# Patient Record
Sex: MALE | Race: OTHER
[De-identification: names, ages, dates, MRNs, and addresses within clinical notes are randomized per-mention and may not be internally consistent; named-entity substitution may affect disease eponyms.]

---

## 2018-09-08 ENCOUNTER — HOSPITAL ENCOUNTER (EMERGENCY)
Age: 58
Discharge: HOME | End: 2018-09-08

## 2018-09-08 ENCOUNTER — HOSPITAL ENCOUNTER (EMERGENCY)
Dept: HOSPITAL 91 - E/R | Age: 58
Discharge: HOME | End: 2018-09-08
Payer: COMMERCIAL

## 2018-09-08 DIAGNOSIS — I10: ICD-10-CM

## 2018-09-08 DIAGNOSIS — G51.0: Primary | ICD-10-CM

## 2018-09-08 PROCEDURE — 99283 EMERGENCY DEPT VISIT LOW MDM: CPT

## 2021-12-07 ENCOUNTER — HOSPITAL ENCOUNTER (EMERGENCY)
Dept: HOSPITAL 54 - ER | Age: 61
Discharge: HOME | End: 2021-12-07
Payer: MEDICAID

## 2021-12-07 VITALS — BODY MASS INDEX: 31.83 KG/M2 | HEIGHT: 72 IN | WEIGHT: 235 LBS

## 2021-12-07 VITALS — DIASTOLIC BLOOD PRESSURE: 87 MMHG | SYSTOLIC BLOOD PRESSURE: 141 MMHG

## 2021-12-07 DIAGNOSIS — I10: ICD-10-CM

## 2021-12-07 DIAGNOSIS — E78.00: ICD-10-CM

## 2021-12-07 DIAGNOSIS — R91.8: ICD-10-CM

## 2021-12-07 DIAGNOSIS — U07.1: Primary | ICD-10-CM

## 2021-12-07 PROCEDURE — 87426 SARSCOV CORONAVIRUS AG IA: CPT

## 2021-12-07 PROCEDURE — 96372 THER/PROPH/DIAG INJ SC/IM: CPT

## 2021-12-07 PROCEDURE — C9803 HOPD COVID-19 SPEC COLLECT: HCPCS

## 2021-12-07 PROCEDURE — 99284 EMERGENCY DEPT VISIT MOD MDM: CPT

## 2021-12-07 PROCEDURE — 71045 X-RAY EXAM CHEST 1 VIEW: CPT

## 2021-12-07 NOTE — NUR
RECEIVED CALL FROM LAB THAT PATIENT TESTED POSITIVE FOR COVID, CALLED AND LEFT 
MESSAGE TO INFORM PATIENT

## 2021-12-08 ENCOUNTER — HOSPITAL ENCOUNTER (INPATIENT)
Dept: HOSPITAL 54 - ER | Age: 61
LOS: 15 days | Discharge: HOME | DRG: 137 | End: 2021-12-23
Attending: INTERNAL MEDICINE | Admitting: INTERNAL MEDICINE
Payer: MEDICAID

## 2021-12-08 VITALS — HEIGHT: 72 IN | WEIGHT: 210 LBS | BODY MASS INDEX: 28.44 KG/M2

## 2021-12-08 DIAGNOSIS — K59.00: ICD-10-CM

## 2021-12-08 DIAGNOSIS — N17.9: ICD-10-CM

## 2021-12-08 DIAGNOSIS — Z79.899: ICD-10-CM

## 2021-12-08 DIAGNOSIS — J96.01: ICD-10-CM

## 2021-12-08 DIAGNOSIS — E78.00: ICD-10-CM

## 2021-12-08 DIAGNOSIS — E66.9: ICD-10-CM

## 2021-12-08 DIAGNOSIS — Z87.891: ICD-10-CM

## 2021-12-08 DIAGNOSIS — E78.5: ICD-10-CM

## 2021-12-08 DIAGNOSIS — J12.82: ICD-10-CM

## 2021-12-08 DIAGNOSIS — J15.9: ICD-10-CM

## 2021-12-08 DIAGNOSIS — U07.1: Primary | ICD-10-CM

## 2021-12-08 DIAGNOSIS — R74.01: ICD-10-CM

## 2021-12-08 DIAGNOSIS — I10: ICD-10-CM

## 2021-12-08 DIAGNOSIS — R73.03: ICD-10-CM

## 2021-12-08 DIAGNOSIS — E66.01: ICD-10-CM

## 2021-12-08 DIAGNOSIS — D69.6: ICD-10-CM

## 2021-12-08 PROCEDURE — G0378 HOSPITAL OBSERVATION PER HR: HCPCS

## 2021-12-08 PROCEDURE — A4216 STERILE WATER/SALINE, 10 ML: HCPCS

## 2021-12-08 PROCEDURE — U0003 INFECTIOUS AGENT DETECTION BY NUCLEIC ACID (DNA OR RNA); SEVERE ACUTE RESPIRATORY SYNDROME CORONAVIRUS 2 (SARS-COV-2) (CORONAVIRUS DISEASE [COVID-19]), AMPLIFIED PROBE TECHNIQUE, MAKING USE OF HIGH THROUGHPUT TECHNOLOGIES AS DESCRIBED BY CMS-2020-01-R: HCPCS

## 2021-12-09 VITALS — DIASTOLIC BLOOD PRESSURE: 73 MMHG | SYSTOLIC BLOOD PRESSURE: 123 MMHG

## 2021-12-09 VITALS — SYSTOLIC BLOOD PRESSURE: 150 MMHG | DIASTOLIC BLOOD PRESSURE: 95 MMHG

## 2021-12-09 LAB
ALBUMIN SERPL BCP-MCNC: 3.3 G/DL (ref 3.4–5)
ALP SERPL-CCNC: 47 U/L (ref 46–116)
ALT SERPL W P-5'-P-CCNC: 106 U/L (ref 12–78)
AST SERPL W P-5'-P-CCNC: 86 U/L (ref 15–37)
BASOPHILS # BLD AUTO: 0 K/UL (ref 0–0.2)
BASOPHILS NFR BLD AUTO: 0.3 % (ref 0–2)
BILIRUB DIRECT SERPL-MCNC: 0.2 MG/DL (ref 0–0.2)
BILIRUB SERPL-MCNC: 0.4 MG/DL (ref 0.2–1)
BUN SERPL-MCNC: 17 MG/DL (ref 7–18)
CALCIUM SERPL-MCNC: 7.7 MG/DL (ref 8.5–10.1)
CHLORIDE SERPL-SCNC: 95 MMOL/L (ref 98–107)
CO2 SERPL-SCNC: 31 MMOL/L (ref 21–32)
CREAT SERPL-MCNC: 1.2 MG/DL (ref 0.6–1.3)
CRP SERPL-MCNC: 14.8 MG/DL (ref 0–0.9)
EOSINOPHIL NFR BLD AUTO: 0.1 % (ref 0–6)
FERRITIN SERPL-MCNC: 1880 NG/ML (ref 8–388)
GLUCOSE SERPL-MCNC: 106 MG/DL (ref 74–106)
HCT VFR BLD AUTO: 38 % (ref 39–51)
HGB BLD-MCNC: 12.8 G/DL (ref 13.5–17.5)
LYMPHOCYTES NFR BLD AUTO: 0.6 K/UL (ref 0.8–4.8)
LYMPHOCYTES NFR BLD AUTO: 15.3 % (ref 20–44)
MCHC RBC AUTO-ENTMCNC: 34 G/DL (ref 31–36)
MCV RBC AUTO: 86 FL (ref 80–96)
MONOCYTES NFR BLD AUTO: 0.2 K/UL (ref 0.1–1.3)
MONOCYTES NFR BLD AUTO: 5.6 % (ref 2–12)
NEUTROPHILS # BLD AUTO: 3 K/UL (ref 1.8–8.9)
NEUTROPHILS NFR BLD AUTO: 78.7 % (ref 43–81)
PLATELET # BLD AUTO: 121 K/UL (ref 150–450)
POTASSIUM SERPL-SCNC: 4.4 MMOL/L (ref 3.5–5.1)
PROT SERPL-MCNC: 6.9 G/DL (ref 6.4–8.2)
RBC # BLD AUTO: 4.44 MIL/UL (ref 4.5–6)
SODIUM SERPL-SCNC: 130 MMOL/L (ref 136–145)
WBC NRBC COR # BLD AUTO: 3.8 K/UL (ref 4.3–11)

## 2021-12-09 PROCEDURE — XW033E5 INTRODUCTION OF REMDESIVIR ANTI-INFECTIVE INTO PERIPHERAL VEIN, PERCUTANEOUS APPROACH, NEW TECHNOLOGY GROUP 5: ICD-10-PCS | Performed by: INTERNAL MEDICINE

## 2021-12-09 RX ADMIN — DEXAMETHASONE SODIUM PHOSPHATE SCH MG: 10 INJECTION INTRAMUSCULAR; INTRAVENOUS at 13:50

## 2021-12-09 RX ADMIN — DEXTROSE MONOHYDRATE SCH MLS/HR: 50 INJECTION, SOLUTION INTRAVENOUS at 21:50

## 2021-12-09 RX ADMIN — ENOXAPARIN SODIUM SCH MG: 40 INJECTION SUBCUTANEOUS at 15:20

## 2021-12-09 RX ADMIN — AMLODIPINE BESYLATE SCH MG: 10 TABLET ORAL at 14:06

## 2021-12-09 RX ADMIN — GUAIFENESIN AND DEXTROMETHORPHAN PRN ML: 100; 10 SYRUP ORAL at 22:57

## 2021-12-09 RX ADMIN — DEXAMETHASONE SODIUM PHOSPHATE SCH MG: 10 INJECTION INTRAMUSCULAR; INTRAVENOUS at 21:14

## 2021-12-09 RX ADMIN — ATORVASTATIN CALCIUM SCH MG: 10 TABLET, FILM COATED ORAL at 21:13

## 2021-12-09 RX ADMIN — GUAIFENESIN AND DEXTROMETHORPHAN PRN ML: 100; 10 SYRUP ORAL at 15:20

## 2021-12-10 VITALS — SYSTOLIC BLOOD PRESSURE: 129 MMHG | DIASTOLIC BLOOD PRESSURE: 65 MMHG

## 2021-12-10 VITALS — SYSTOLIC BLOOD PRESSURE: 114 MMHG | DIASTOLIC BLOOD PRESSURE: 81 MMHG

## 2021-12-10 VITALS — SYSTOLIC BLOOD PRESSURE: 140 MMHG | DIASTOLIC BLOOD PRESSURE: 60 MMHG

## 2021-12-10 VITALS — DIASTOLIC BLOOD PRESSURE: 70 MMHG | SYSTOLIC BLOOD PRESSURE: 115 MMHG

## 2021-12-10 LAB
ALBUMIN SERPL BCP-MCNC: 2.8 G/DL (ref 3.4–5)
ALP SERPL-CCNC: 46 U/L (ref 46–116)
ALT SERPL W P-5'-P-CCNC: 254 U/L (ref 12–78)
AST SERPL W P-5'-P-CCNC: 188 U/L (ref 15–37)
BASOPHILS # BLD AUTO: 0 K/UL (ref 0–0.2)
BASOPHILS NFR BLD AUTO: 0 % (ref 0–2)
BILIRUB SERPL-MCNC: 0.3 MG/DL (ref 0.2–1)
BUN SERPL-MCNC: 25 MG/DL (ref 7–18)
CALCIUM SERPL-MCNC: 8 MG/DL (ref 8.5–10.1)
CHLORIDE SERPL-SCNC: 102 MMOL/L (ref 98–107)
CO2 SERPL-SCNC: 25 MMOL/L (ref 21–32)
CREAT SERPL-MCNC: 1.2 MG/DL (ref 0.6–1.3)
CRP SERPL-MCNC: 15.6 MG/DL (ref 0–0.9)
EOSINOPHIL NFR BLD AUTO: 0 % (ref 0–6)
FERRITIN SERPL-MCNC: 2278 NG/ML (ref 8–388)
GLUCOSE SERPL-MCNC: 140 MG/DL (ref 74–106)
HCT VFR BLD AUTO: 38 % (ref 39–51)
HGB BLD-MCNC: 12.9 G/DL (ref 13.5–17.5)
LYMPHOCYTES NFR BLD AUTO: 0.3 K/UL (ref 0.8–4.8)
LYMPHOCYTES NFR BLD AUTO: 8.5 % (ref 20–44)
MCHC RBC AUTO-ENTMCNC: 34 G/DL (ref 31–36)
MCV RBC AUTO: 87 FL (ref 80–96)
MONOCYTES NFR BLD AUTO: 0.3 K/UL (ref 0.1–1.3)
MONOCYTES NFR BLD AUTO: 9 % (ref 2–12)
NEUTROPHILS # BLD AUTO: 2.7 K/UL (ref 1.8–8.9)
NEUTROPHILS NFR BLD AUTO: 82.5 % (ref 43–81)
PLATELET # BLD AUTO: 155 K/UL (ref 150–450)
POTASSIUM SERPL-SCNC: 4.4 MMOL/L (ref 3.5–5.1)
PROT SERPL-MCNC: 6.6 G/DL (ref 6.4–8.2)
RBC # BLD AUTO: 4.41 MIL/UL (ref 4.5–6)
SODIUM SERPL-SCNC: 135 MMOL/L (ref 136–145)
WBC NRBC COR # BLD AUTO: 3.2 K/UL (ref 4.3–11)

## 2021-12-10 RX ADMIN — DEXAMETHASONE SODIUM PHOSPHATE SCH MG: 10 INJECTION INTRAMUSCULAR; INTRAVENOUS at 13:53

## 2021-12-10 RX ADMIN — ENOXAPARIN SODIUM SCH MG: 40 INJECTION SUBCUTANEOUS at 21:57

## 2021-12-10 RX ADMIN — ATORVASTATIN CALCIUM SCH MG: 10 TABLET, FILM COATED ORAL at 21:54

## 2021-12-10 RX ADMIN — Medication SCH EACH: at 12:35

## 2021-12-10 RX ADMIN — SODIUM CHLORIDE SCH MLS/HR: 9 INJECTION, SOLUTION INTRAVENOUS at 18:12

## 2021-12-10 RX ADMIN — HUMAN INSULIN PRN UNIT: 100 INJECTION, SOLUTION SUBCUTANEOUS at 22:14

## 2021-12-10 RX ADMIN — Medication SCH EACH: at 21:54

## 2021-12-10 RX ADMIN — Medication SCH EACH: at 18:05

## 2021-12-10 RX ADMIN — GUAIFENESIN AND DEXTROMETHORPHAN PRN ML: 100; 10 SYRUP ORAL at 20:06

## 2021-12-10 RX ADMIN — DEXTROSE MONOHYDRATE SCH MLS/HR: 50 INJECTION, SOLUTION INTRAVENOUS at 21:54

## 2021-12-10 RX ADMIN — DEXAMETHASONE SODIUM PHOSPHATE SCH MG: 10 INJECTION INTRAMUSCULAR; INTRAVENOUS at 21:54

## 2021-12-10 RX ADMIN — AMLODIPINE BESYLATE SCH MG: 10 TABLET ORAL at 09:19

## 2021-12-10 RX ADMIN — DEXAMETHASONE SODIUM PHOSPHATE SCH MG: 10 INJECTION INTRAMUSCULAR; INTRAVENOUS at 05:31

## 2021-12-10 RX ADMIN — GUAIFENESIN AND DEXTROMETHORPHAN PRN ML: 100; 10 SYRUP ORAL at 09:20

## 2021-12-11 VITALS — DIASTOLIC BLOOD PRESSURE: 62 MMHG | SYSTOLIC BLOOD PRESSURE: 113 MMHG

## 2021-12-11 VITALS — SYSTOLIC BLOOD PRESSURE: 114 MMHG | DIASTOLIC BLOOD PRESSURE: 67 MMHG

## 2021-12-11 VITALS — SYSTOLIC BLOOD PRESSURE: 110 MMHG | DIASTOLIC BLOOD PRESSURE: 64 MMHG

## 2021-12-11 VITALS — SYSTOLIC BLOOD PRESSURE: 104 MMHG | DIASTOLIC BLOOD PRESSURE: 59 MMHG

## 2021-12-11 LAB
ALBUMIN SERPL BCP-MCNC: 3.1 G/DL (ref 3.4–5)
ALBUMIN SERPL BCP-MCNC: 3.1 G/DL (ref 3.4–5)
ALP SERPL-CCNC: 52 U/L (ref 46–116)
ALP SERPL-CCNC: 55 U/L (ref 46–116)
ALT SERPL W P-5'-P-CCNC: 346 U/L (ref 12–78)
ALT SERPL W P-5'-P-CCNC: 351 U/L (ref 12–78)
AST SERPL W P-5'-P-CCNC: 209 U/L (ref 15–37)
AST SERPL W P-5'-P-CCNC: 214 U/L (ref 15–37)
BASOPHILS # BLD AUTO: 0 K/UL (ref 0–0.2)
BASOPHILS NFR BLD AUTO: 0.1 % (ref 0–2)
BILIRUB DIRECT SERPL-MCNC: 0.2 MG/DL (ref 0–0.2)
BILIRUB SERPL-MCNC: 0.3 MG/DL (ref 0.2–1)
BILIRUB SERPL-MCNC: 0.3 MG/DL (ref 0.2–1)
BUN SERPL-MCNC: 45 MG/DL (ref 7–18)
CALCIUM SERPL-MCNC: 8.4 MG/DL (ref 8.5–10.1)
CHLORIDE SERPL-SCNC: 102 MMOL/L (ref 98–107)
CO2 SERPL-SCNC: 27 MMOL/L (ref 21–32)
CREAT SERPL-MCNC: 1.5 MG/DL (ref 0.6–1.3)
CRP SERPL-MCNC: 10.3 MG/DL (ref 0–0.9)
EOSINOPHIL NFR BLD AUTO: 0 % (ref 0–6)
GLUCOSE SERPL-MCNC: 142 MG/DL (ref 74–106)
HCT VFR BLD AUTO: 42 % (ref 39–51)
HGB BLD-MCNC: 14 G/DL (ref 13.5–17.5)
LYMPHOCYTES NFR BLD AUTO: 0.9 K/UL (ref 0.8–4.8)
LYMPHOCYTES NFR BLD AUTO: 8.9 % (ref 20–44)
MCHC RBC AUTO-ENTMCNC: 34 G/DL (ref 31–36)
MCV RBC AUTO: 87 FL (ref 80–96)
MONOCYTES NFR BLD AUTO: 0.5 K/UL (ref 0.1–1.3)
MONOCYTES NFR BLD AUTO: 5.5 % (ref 2–12)
NEUTROPHILS # BLD AUTO: 8.3 K/UL (ref 1.8–8.9)
NEUTROPHILS NFR BLD AUTO: 85.5 % (ref 43–81)
PLATELET # BLD AUTO: 272 K/UL (ref 150–450)
POTASSIUM SERPL-SCNC: 4.3 MMOL/L (ref 3.5–5.1)
PROT SERPL-MCNC: 7.5 G/DL (ref 6.4–8.2)
PROT SERPL-MCNC: 7.5 G/DL (ref 6.4–8.2)
RBC # BLD AUTO: 4.8 MIL/UL (ref 4.5–6)
SODIUM SERPL-SCNC: 138 MMOL/L (ref 136–145)
WBC NRBC COR # BLD AUTO: 9.7 K/UL (ref 4.3–11)

## 2021-12-11 RX ADMIN — INSULIN HUMAN PRN UNITS: 100 INJECTION, SOLUTION PARENTERAL at 22:08

## 2021-12-11 RX ADMIN — Medication SCH EACH: at 12:01

## 2021-12-11 RX ADMIN — GUAIFENESIN AND DEXTROMETHORPHAN PRN ML: 100; 10 SYRUP ORAL at 18:36

## 2021-12-11 RX ADMIN — Medication SCH EACH: at 22:03

## 2021-12-11 RX ADMIN — Medication SCH MG: at 12:30

## 2021-12-11 RX ADMIN — GUAIFENESIN AND DEXTROMETHORPHAN PRN ML: 100; 10 SYRUP ORAL at 20:04

## 2021-12-11 RX ADMIN — Medication SCH EACH: at 17:31

## 2021-12-11 RX ADMIN — SODIUM CHLORIDE SCH MLS/HR: 9 INJECTION, SOLUTION INTRAVENOUS at 17:41

## 2021-12-11 RX ADMIN — INSULIN HUMAN PRN UNITS: 100 INJECTION, SOLUTION PARENTERAL at 12:15

## 2021-12-11 RX ADMIN — DEXAMETHASONE SODIUM PHOSPHATE SCH MG: 10 INJECTION INTRAMUSCULAR; INTRAVENOUS at 12:01

## 2021-12-11 RX ADMIN — DEXAMETHASONE SODIUM PHOSPHATE SCH MG: 10 INJECTION INTRAMUSCULAR; INTRAVENOUS at 05:16

## 2021-12-11 RX ADMIN — ENOXAPARIN SODIUM SCH MG: 40 INJECTION SUBCUTANEOUS at 21:28

## 2021-12-11 RX ADMIN — Medication SCH MG: at 17:00

## 2021-12-11 RX ADMIN — AMLODIPINE BESYLATE SCH MG: 10 TABLET ORAL at 08:26

## 2021-12-11 RX ADMIN — Medication SCH EACH: at 08:04

## 2021-12-11 RX ADMIN — DEXAMETHASONE SODIUM PHOSPHATE SCH MG: 10 INJECTION INTRAMUSCULAR; INTRAVENOUS at 21:26

## 2021-12-11 RX ADMIN — INSULIN HUMAN PRN UNITS: 100 INJECTION, SOLUTION PARENTERAL at 17:41

## 2021-12-11 RX ADMIN — DEXTROSE MONOHYDRATE SCH MLS/HR: 50 INJECTION, SOLUTION INTRAVENOUS at 21:26

## 2021-12-11 RX ADMIN — GUAIFENESIN AND DEXTROMETHORPHAN PRN ML: 100; 10 SYRUP ORAL at 14:37

## 2021-12-11 RX ADMIN — GUAIFENESIN AND DEXTROMETHORPHAN PRN ML: 100; 10 SYRUP ORAL at 08:35

## 2021-12-11 RX ADMIN — INSULIN HUMAN PRN UNITS: 100 INJECTION, SOLUTION PARENTERAL at 08:36

## 2021-12-12 VITALS — DIASTOLIC BLOOD PRESSURE: 72 MMHG | SYSTOLIC BLOOD PRESSURE: 116 MMHG

## 2021-12-12 VITALS — SYSTOLIC BLOOD PRESSURE: 110 MMHG | DIASTOLIC BLOOD PRESSURE: 67 MMHG

## 2021-12-12 VITALS — DIASTOLIC BLOOD PRESSURE: 66 MMHG | SYSTOLIC BLOOD PRESSURE: 111 MMHG

## 2021-12-12 VITALS — DIASTOLIC BLOOD PRESSURE: 66 MMHG | SYSTOLIC BLOOD PRESSURE: 130 MMHG

## 2021-12-12 VITALS — SYSTOLIC BLOOD PRESSURE: 105 MMHG | DIASTOLIC BLOOD PRESSURE: 65 MMHG

## 2021-12-12 VITALS — DIASTOLIC BLOOD PRESSURE: 65 MMHG | SYSTOLIC BLOOD PRESSURE: 110 MMHG

## 2021-12-12 LAB
ALBUMIN SERPL BCP-MCNC: 2.8 G/DL (ref 3.4–5)
ALBUMIN SERPL BCP-MCNC: 2.9 G/DL (ref 3.4–5)
ALP SERPL-CCNC: 49 U/L (ref 46–116)
ALP SERPL-CCNC: 51 U/L (ref 46–116)
ALT SERPL W P-5'-P-CCNC: 246 U/L (ref 12–78)
ALT SERPL W P-5'-P-CCNC: 255 U/L (ref 12–78)
AST SERPL W P-5'-P-CCNC: 104 U/L (ref 15–37)
AST SERPL W P-5'-P-CCNC: 108 U/L (ref 15–37)
BASOPHILS # BLD AUTO: 0 K/UL (ref 0–0.2)
BASOPHILS NFR BLD AUTO: 0.1 % (ref 0–2)
BILIRUB DIRECT SERPL-MCNC: 0.1 MG/DL (ref 0–0.2)
BILIRUB SERPL-MCNC: 0.3 MG/DL (ref 0.2–1)
BILIRUB SERPL-MCNC: 0.3 MG/DL (ref 0.2–1)
BUN SERPL-MCNC: 43 MG/DL (ref 7–18)
CALCIUM SERPL-MCNC: 8.3 MG/DL (ref 8.5–10.1)
CHLORIDE SERPL-SCNC: 102 MMOL/L (ref 98–107)
CO2 SERPL-SCNC: 26 MMOL/L (ref 21–32)
CREAT SERPL-MCNC: 1.2 MG/DL (ref 0.6–1.3)
CRP SERPL-MCNC: 5 MG/DL (ref 0–0.9)
EOSINOPHIL NFR BLD AUTO: 0 % (ref 0–6)
GLUCOSE SERPL-MCNC: 137 MG/DL (ref 74–106)
HCT VFR BLD AUTO: 39 % (ref 39–51)
HGB BLD-MCNC: 13.1 G/DL (ref 13.5–17.5)
LYMPHOCYTES NFR BLD AUTO: 0.4 K/UL (ref 0.8–4.8)
LYMPHOCYTES NFR BLD AUTO: 4.4 % (ref 20–44)
MCHC RBC AUTO-ENTMCNC: 34 G/DL (ref 31–36)
MCV RBC AUTO: 86 FL (ref 80–96)
MONOCYTES NFR BLD AUTO: 0.8 K/UL (ref 0.1–1.3)
MONOCYTES NFR BLD AUTO: 8.5 % (ref 2–12)
NEUTROPHILS # BLD AUTO: 8.3 K/UL (ref 1.8–8.9)
NEUTROPHILS NFR BLD AUTO: 87 % (ref 43–81)
PLATELET # BLD AUTO: 290 K/UL (ref 150–450)
POTASSIUM SERPL-SCNC: 4.6 MMOL/L (ref 3.5–5.1)
PROT SERPL-MCNC: 6.7 G/DL (ref 6.4–8.2)
PROT SERPL-MCNC: 6.8 G/DL (ref 6.4–8.2)
RBC # BLD AUTO: 4.46 MIL/UL (ref 4.5–6)
SODIUM SERPL-SCNC: 138 MMOL/L (ref 136–145)
WBC NRBC COR # BLD AUTO: 9.5 K/UL (ref 4.3–11)

## 2021-12-12 PROCEDURE — XW033H5 INTRODUCTION OF TOCILIZUMAB INTO PERIPHERAL VEIN, PERCUTANEOUS APPROACH, NEW TECHNOLOGY GROUP 5: ICD-10-PCS | Performed by: INTERNAL MEDICINE

## 2021-12-12 RX ADMIN — Medication SCH EACH: at 17:39

## 2021-12-12 RX ADMIN — SODIUM CHLORIDE SCH MLS/HR: 9 INJECTION, SOLUTION INTRAVENOUS at 18:01

## 2021-12-12 RX ADMIN — Medication SCH EACH: at 08:10

## 2021-12-12 RX ADMIN — GUAIFENESIN AND DEXTROMETHORPHAN PRN ML: 100; 10 SYRUP ORAL at 18:10

## 2021-12-12 RX ADMIN — ENOXAPARIN SODIUM SCH MG: 40 INJECTION SUBCUTANEOUS at 21:54

## 2021-12-12 RX ADMIN — DEXAMETHASONE SODIUM PHOSPHATE SCH MG: 10 INJECTION INTRAMUSCULAR; INTRAVENOUS at 21:53

## 2021-12-12 RX ADMIN — AMLODIPINE BESYLATE SCH MG: 10 TABLET ORAL at 08:07

## 2021-12-12 RX ADMIN — Medication SCH MG: at 16:53

## 2021-12-12 RX ADMIN — DEXAMETHASONE SODIUM PHOSPHATE SCH MG: 10 INJECTION INTRAMUSCULAR; INTRAVENOUS at 05:56

## 2021-12-12 RX ADMIN — GUAIFENESIN AND DEXTROMETHORPHAN PRN ML: 100; 10 SYRUP ORAL at 01:48

## 2021-12-12 RX ADMIN — INSULIN HUMAN PRN UNITS: 100 INJECTION, SOLUTION PARENTERAL at 17:41

## 2021-12-12 RX ADMIN — INSULIN HUMAN PRN UNITS: 100 INJECTION, SOLUTION PARENTERAL at 08:33

## 2021-12-12 RX ADMIN — DEXTROSE MONOHYDRATE SCH MLS/HR: 50 INJECTION, SOLUTION INTRAVENOUS at 21:53

## 2021-12-12 RX ADMIN — Medication SCH EACH: at 22:54

## 2021-12-12 RX ADMIN — Medication SCH MG: at 08:10

## 2021-12-12 RX ADMIN — ENOXAPARIN SODIUM SCH MG: 40 INJECTION SUBCUTANEOUS at 08:11

## 2021-12-12 RX ADMIN — Medication SCH EACH: at 11:03

## 2021-12-12 RX ADMIN — INSULIN HUMAN PRN UNITS: 100 INJECTION, SOLUTION PARENTERAL at 22:53

## 2021-12-12 RX ADMIN — DEXAMETHASONE SODIUM PHOSPHATE SCH MG: 10 INJECTION INTRAMUSCULAR; INTRAVENOUS at 12:04

## 2021-12-13 VITALS — SYSTOLIC BLOOD PRESSURE: 118 MMHG | DIASTOLIC BLOOD PRESSURE: 65 MMHG

## 2021-12-13 VITALS — DIASTOLIC BLOOD PRESSURE: 69 MMHG | SYSTOLIC BLOOD PRESSURE: 117 MMHG

## 2021-12-13 VITALS — DIASTOLIC BLOOD PRESSURE: 70 MMHG | SYSTOLIC BLOOD PRESSURE: 110 MMHG

## 2021-12-13 VITALS — SYSTOLIC BLOOD PRESSURE: 105 MMHG | DIASTOLIC BLOOD PRESSURE: 65 MMHG

## 2021-12-13 VITALS — SYSTOLIC BLOOD PRESSURE: 112 MMHG | DIASTOLIC BLOOD PRESSURE: 64 MMHG

## 2021-12-13 VITALS — SYSTOLIC BLOOD PRESSURE: 106 MMHG | DIASTOLIC BLOOD PRESSURE: 58 MMHG

## 2021-12-13 LAB
ALBUMIN SERPL BCP-MCNC: 2.7 G/DL (ref 3.4–5)
ALP SERPL-CCNC: 49 U/L (ref 46–116)
ALT SERPL W P-5'-P-CCNC: 208 U/L (ref 12–78)
AST SERPL W P-5'-P-CCNC: 70 U/L (ref 15–37)
BASOPHILS # BLD AUTO: 0 K/UL (ref 0–0.2)
BASOPHILS NFR BLD AUTO: 0.1 % (ref 0–2)
BILIRUB SERPL-MCNC: 0.4 MG/DL (ref 0.2–1)
BUN SERPL-MCNC: 42 MG/DL (ref 7–18)
CALCIUM SERPL-MCNC: 7.8 MG/DL (ref 8.5–10.1)
CHLORIDE SERPL-SCNC: 102 MMOL/L (ref 98–107)
CO2 SERPL-SCNC: 28 MMOL/L (ref 21–32)
CREAT SERPL-MCNC: 1.3 MG/DL (ref 0.6–1.3)
EOSINOPHIL NFR BLD AUTO: 0 % (ref 0–6)
GLUCOSE SERPL-MCNC: 132 MG/DL (ref 74–106)
HCT VFR BLD AUTO: 38 % (ref 39–51)
HGB BLD-MCNC: 12.8 G/DL (ref 13.5–17.5)
LYMPHOCYTES NFR BLD AUTO: 0.4 K/UL (ref 0.8–4.8)
LYMPHOCYTES NFR BLD AUTO: 4.3 % (ref 20–44)
MCHC RBC AUTO-ENTMCNC: 34 G/DL (ref 31–36)
MCV RBC AUTO: 86 FL (ref 80–96)
MONOCYTES NFR BLD AUTO: 0.8 K/UL (ref 0.1–1.3)
MONOCYTES NFR BLD AUTO: 9.3 % (ref 2–12)
NEUTROPHILS # BLD AUTO: 7 K/UL (ref 1.8–8.9)
NEUTROPHILS NFR BLD AUTO: 86.3 % (ref 43–81)
PLATELET # BLD AUTO: 335 K/UL (ref 150–450)
POTASSIUM SERPL-SCNC: 4.5 MMOL/L (ref 3.5–5.1)
PROT SERPL-MCNC: 6.5 G/DL (ref 6.4–8.2)
RBC # BLD AUTO: 4.38 MIL/UL (ref 4.5–6)
SODIUM SERPL-SCNC: 137 MMOL/L (ref 136–145)
WBC NRBC COR # BLD AUTO: 8.1 K/UL (ref 4.3–11)

## 2021-12-13 RX ADMIN — DEXAMETHASONE SODIUM PHOSPHATE SCH MG: 10 INJECTION INTRAMUSCULAR; INTRAVENOUS at 20:27

## 2021-12-13 RX ADMIN — Medication SCH EACH: at 17:39

## 2021-12-13 RX ADMIN — Medication SCH MG: at 17:12

## 2021-12-13 RX ADMIN — DEXAMETHASONE SODIUM PHOSPHATE SCH MG: 10 INJECTION INTRAMUSCULAR; INTRAVENOUS at 05:27

## 2021-12-13 RX ADMIN — GUAIFENESIN AND DEXTROMETHORPHAN PRN ML: 100; 10 SYRUP ORAL at 01:38

## 2021-12-13 RX ADMIN — SODIUM CHLORIDE SCH MLS/HR: 9 INJECTION, SOLUTION INTRAVENOUS at 18:43

## 2021-12-13 RX ADMIN — Medication SCH EACH: at 07:27

## 2021-12-13 RX ADMIN — Medication SCH MG: at 10:24

## 2021-12-13 RX ADMIN — DEXTROSE MONOHYDRATE SCH MLS/HR: 50 INJECTION, SOLUTION INTRAVENOUS at 20:27

## 2021-12-13 RX ADMIN — INSULIN HUMAN PRN UNITS: 100 INJECTION, SOLUTION PARENTERAL at 17:39

## 2021-12-13 RX ADMIN — ENOXAPARIN SODIUM SCH MG: 40 INJECTION SUBCUTANEOUS at 10:26

## 2021-12-13 RX ADMIN — HUMAN INSULIN PRN UNIT: 100 INJECTION, SOLUTION SUBCUTANEOUS at 21:48

## 2021-12-13 RX ADMIN — ENOXAPARIN SODIUM SCH MG: 40 INJECTION SUBCUTANEOUS at 20:28

## 2021-12-13 RX ADMIN — AMLODIPINE BESYLATE SCH MG: 5 TABLET ORAL at 10:24

## 2021-12-13 RX ADMIN — DEXAMETHASONE SODIUM PHOSPHATE SCH MG: 10 INJECTION INTRAMUSCULAR; INTRAVENOUS at 13:33

## 2021-12-13 RX ADMIN — Medication SCH EACH: at 21:39

## 2021-12-13 RX ADMIN — Medication SCH EACH: at 12:00

## 2021-12-14 VITALS — SYSTOLIC BLOOD PRESSURE: 118 MMHG | DIASTOLIC BLOOD PRESSURE: 66 MMHG

## 2021-12-14 VITALS — SYSTOLIC BLOOD PRESSURE: 108 MMHG | DIASTOLIC BLOOD PRESSURE: 72 MMHG

## 2021-12-14 VITALS — SYSTOLIC BLOOD PRESSURE: 119 MMHG | DIASTOLIC BLOOD PRESSURE: 68 MMHG

## 2021-12-14 VITALS — DIASTOLIC BLOOD PRESSURE: 71 MMHG | SYSTOLIC BLOOD PRESSURE: 120 MMHG

## 2021-12-14 VITALS — SYSTOLIC BLOOD PRESSURE: 98 MMHG | DIASTOLIC BLOOD PRESSURE: 65 MMHG

## 2021-12-14 LAB
ALBUMIN SERPL BCP-MCNC: 2.6 G/DL (ref 3.4–5)
ALP SERPL-CCNC: 45 U/L (ref 46–116)
ALT SERPL W P-5'-P-CCNC: 176 U/L (ref 12–78)
AST SERPL W P-5'-P-CCNC: 51 U/L (ref 15–37)
BILIRUB DIRECT SERPL-MCNC: 0.1 MG/DL (ref 0–0.2)
BILIRUB SERPL-MCNC: 0.3 MG/DL (ref 0.2–1)
PROT SERPL-MCNC: 6.1 G/DL (ref 6.4–8.2)

## 2021-12-14 PROCEDURE — 05HB33Z INSERTION OF INFUSION DEVICE INTO RIGHT BASILIC VEIN, PERCUTANEOUS APPROACH: ICD-10-PCS | Performed by: NURSE PRACTITIONER

## 2021-12-14 RX ADMIN — Medication SCH EACH: at 17:13

## 2021-12-14 RX ADMIN — Medication SCH EACH: at 09:13

## 2021-12-14 RX ADMIN — INSULIN HUMAN PRN UNITS: 100 INJECTION, SOLUTION PARENTERAL at 17:06

## 2021-12-14 RX ADMIN — DEXTROSE MONOHYDRATE SCH MLS/HR: 50 INJECTION, SOLUTION INTRAVENOUS at 20:50

## 2021-12-14 RX ADMIN — ENOXAPARIN SODIUM SCH MG: 40 INJECTION SUBCUTANEOUS at 09:13

## 2021-12-14 RX ADMIN — Medication SCH EACH: at 12:14

## 2021-12-14 RX ADMIN — ENOXAPARIN SODIUM SCH MG: 40 INJECTION SUBCUTANEOUS at 20:50

## 2021-12-14 RX ADMIN — DEXAMETHASONE SODIUM PHOSPHATE SCH MG: 10 INJECTION INTRAMUSCULAR; INTRAVENOUS at 05:59

## 2021-12-14 RX ADMIN — DEXAMETHASONE SODIUM PHOSPHATE SCH MG: 10 INJECTION INTRAMUSCULAR; INTRAVENOUS at 12:15

## 2021-12-14 RX ADMIN — Medication SCH MG: at 16:20

## 2021-12-14 RX ADMIN — DEXAMETHASONE SODIUM PHOSPHATE SCH MG: 10 INJECTION INTRAMUSCULAR; INTRAVENOUS at 20:51

## 2021-12-14 RX ADMIN — GUAIFENESIN AND DEXTROMETHORPHAN PRN ML: 100; 10 SYRUP ORAL at 22:28

## 2021-12-14 RX ADMIN — Medication SCH EACH: at 22:47

## 2021-12-14 RX ADMIN — AMLODIPINE BESYLATE SCH MG: 5 TABLET ORAL at 09:12

## 2021-12-14 RX ADMIN — GUAIFENESIN AND DEXTROMETHORPHAN PRN ML: 100; 10 SYRUP ORAL at 10:52

## 2021-12-14 RX ADMIN — INSULIN HUMAN PRN UNITS: 100 INJECTION, SOLUTION PARENTERAL at 12:14

## 2021-12-14 RX ADMIN — Medication SCH MG: at 09:11

## 2021-12-14 RX ADMIN — HUMAN INSULIN PRN UNIT: 100 INJECTION, SOLUTION SUBCUTANEOUS at 22:50

## 2021-12-15 VITALS — SYSTOLIC BLOOD PRESSURE: 108 MMHG | DIASTOLIC BLOOD PRESSURE: 66 MMHG

## 2021-12-15 VITALS — DIASTOLIC BLOOD PRESSURE: 64 MMHG | SYSTOLIC BLOOD PRESSURE: 115 MMHG

## 2021-12-15 VITALS — DIASTOLIC BLOOD PRESSURE: 65 MMHG | SYSTOLIC BLOOD PRESSURE: 120 MMHG

## 2021-12-15 VITALS — DIASTOLIC BLOOD PRESSURE: 73 MMHG | SYSTOLIC BLOOD PRESSURE: 103 MMHG

## 2021-12-15 VITALS — SYSTOLIC BLOOD PRESSURE: 114 MMHG | DIASTOLIC BLOOD PRESSURE: 64 MMHG

## 2021-12-15 VITALS — SYSTOLIC BLOOD PRESSURE: 124 MMHG | DIASTOLIC BLOOD PRESSURE: 64 MMHG

## 2021-12-15 LAB
ALBUMIN SERPL BCP-MCNC: 2.6 G/DL (ref 3.4–5)
ALP SERPL-CCNC: 44 U/L (ref 46–116)
ALT SERPL W P-5'-P-CCNC: 146 U/L (ref 12–78)
AST SERPL W P-5'-P-CCNC: 37 U/L (ref 15–37)
BASOPHILS # BLD AUTO: 0 K/UL (ref 0–0.2)
BASOPHILS NFR BLD AUTO: 0.1 % (ref 0–2)
BILIRUB SERPL-MCNC: 0.4 MG/DL (ref 0.2–1)
BUN SERPL-MCNC: 39 MG/DL (ref 7–18)
CALCIUM SERPL-MCNC: 7.8 MG/DL (ref 8.5–10.1)
CHLORIDE SERPL-SCNC: 105 MMOL/L (ref 98–107)
CO2 SERPL-SCNC: 23 MMOL/L (ref 21–32)
CREAT SERPL-MCNC: 1.3 MG/DL (ref 0.6–1.3)
CRP SERPL-MCNC: 1.2 MG/DL (ref 0–0.9)
EOSINOPHIL NFR BLD AUTO: 0 % (ref 0–6)
GLUCOSE SERPL-MCNC: 105 MG/DL (ref 74–106)
HCT VFR BLD AUTO: 38 % (ref 39–51)
HGB BLD-MCNC: 12.6 G/DL (ref 13.5–17.5)
LYMPHOCYTES NFR BLD AUTO: 0.4 K/UL (ref 0.8–4.8)
LYMPHOCYTES NFR BLD AUTO: 3.5 % (ref 20–44)
MCHC RBC AUTO-ENTMCNC: 34 G/DL (ref 31–36)
MCV RBC AUTO: 87 FL (ref 80–96)
MONOCYTES NFR BLD AUTO: 0.8 K/UL (ref 0.1–1.3)
MONOCYTES NFR BLD AUTO: 7.8 % (ref 2–12)
NEUTROPHILS # BLD AUTO: 9 K/UL (ref 1.8–8.9)
NEUTROPHILS NFR BLD AUTO: 88.6 % (ref 43–81)
PLATELET # BLD AUTO: 422 K/UL (ref 150–450)
POTASSIUM SERPL-SCNC: 4.3 MMOL/L (ref 3.5–5.1)
PROT SERPL-MCNC: 6 G/DL (ref 6.4–8.2)
RBC # BLD AUTO: 4.35 MIL/UL (ref 4.5–6)
SODIUM SERPL-SCNC: 137 MMOL/L (ref 136–145)
WBC NRBC COR # BLD AUTO: 10.2 K/UL (ref 4.3–11)

## 2021-12-15 RX ADMIN — ENOXAPARIN SODIUM SCH MG: 40 INJECTION SUBCUTANEOUS at 20:39

## 2021-12-15 RX ADMIN — METFORMIN HYDROCHLORIDE SCH MG: 500 TABLET, FILM COATED ORAL at 11:24

## 2021-12-15 RX ADMIN — Medication SCH EACH: at 13:01

## 2021-12-15 RX ADMIN — Medication SCH EACH: at 21:53

## 2021-12-15 RX ADMIN — AMLODIPINE BESYLATE SCH MG: 5 TABLET ORAL at 08:33

## 2021-12-15 RX ADMIN — Medication SCH MG: at 17:11

## 2021-12-15 RX ADMIN — Medication SCH EACH: at 17:17

## 2021-12-15 RX ADMIN — GUAIFENESIN AND DEXTROMETHORPHAN PRN ML: 100; 10 SYRUP ORAL at 05:21

## 2021-12-15 RX ADMIN — ENOXAPARIN SODIUM SCH MG: 40 INJECTION SUBCUTANEOUS at 08:34

## 2021-12-15 RX ADMIN — METFORMIN HYDROCHLORIDE SCH MG: 500 TABLET, FILM COATED ORAL at 17:11

## 2021-12-15 RX ADMIN — DEXAMETHASONE SODIUM PHOSPHATE SCH MG: 10 INJECTION INTRAMUSCULAR; INTRAVENOUS at 05:21

## 2021-12-15 RX ADMIN — Medication SCH MG: at 08:36

## 2021-12-15 RX ADMIN — DEXAMETHASONE SODIUM PHOSPHATE SCH MG: 10 INJECTION INTRAMUSCULAR; INTRAVENOUS at 12:52

## 2021-12-15 RX ADMIN — DEXAMETHASONE SODIUM PHOSPHATE SCH MG: 10 INJECTION INTRAMUSCULAR; INTRAVENOUS at 20:39

## 2021-12-15 RX ADMIN — GUAIFENESIN AND CODEINE PHOSPHATE PRN ML: 100; 10 SOLUTION ORAL at 20:39

## 2021-12-15 RX ADMIN — INSULIN HUMAN PRN UNITS: 100 INJECTION, SOLUTION PARENTERAL at 17:33

## 2021-12-15 RX ADMIN — Medication SCH EACH: at 09:13

## 2021-12-16 VITALS — DIASTOLIC BLOOD PRESSURE: 59 MMHG | SYSTOLIC BLOOD PRESSURE: 113 MMHG

## 2021-12-16 VITALS — SYSTOLIC BLOOD PRESSURE: 133 MMHG | DIASTOLIC BLOOD PRESSURE: 75 MMHG

## 2021-12-16 VITALS — SYSTOLIC BLOOD PRESSURE: 146 MMHG | DIASTOLIC BLOOD PRESSURE: 81 MMHG

## 2021-12-16 VITALS — DIASTOLIC BLOOD PRESSURE: 72 MMHG | SYSTOLIC BLOOD PRESSURE: 125 MMHG

## 2021-12-16 VITALS — DIASTOLIC BLOOD PRESSURE: 78 MMHG | SYSTOLIC BLOOD PRESSURE: 127 MMHG

## 2021-12-16 VITALS — DIASTOLIC BLOOD PRESSURE: 75 MMHG | SYSTOLIC BLOOD PRESSURE: 134 MMHG

## 2021-12-16 LAB
ALBUMIN SERPL BCP-MCNC: 2.7 G/DL (ref 3.4–5)
ALP SERPL-CCNC: 50 U/L (ref 46–116)
ALT SERPL W P-5'-P-CCNC: 127 U/L (ref 12–78)
AST SERPL W P-5'-P-CCNC: 28 U/L (ref 15–37)
BILIRUB DIRECT SERPL-MCNC: 0.1 MG/DL (ref 0–0.2)
BILIRUB SERPL-MCNC: 0.5 MG/DL (ref 0.2–1)
CRP SERPL-MCNC: 0.5 MG/DL (ref 0–0.9)
PROT SERPL-MCNC: 5.5 G/DL (ref 6.4–8.2)

## 2021-12-16 RX ADMIN — DEXAMETHASONE SODIUM PHOSPHATE SCH MG: 10 INJECTION INTRAMUSCULAR; INTRAVENOUS at 04:04

## 2021-12-16 RX ADMIN — ENOXAPARIN SODIUM SCH MG: 40 INJECTION SUBCUTANEOUS at 08:22

## 2021-12-16 RX ADMIN — Medication SCH MG: at 17:13

## 2021-12-16 RX ADMIN — Medication SCH EACH: at 22:00

## 2021-12-16 RX ADMIN — AMLODIPINE BESYLATE SCH MG: 5 TABLET ORAL at 08:20

## 2021-12-16 RX ADMIN — ENOXAPARIN SODIUM SCH MG: 40 INJECTION SUBCUTANEOUS at 23:29

## 2021-12-16 RX ADMIN — INSULIN HUMAN PRN UNITS: 100 INJECTION, SOLUTION PARENTERAL at 10:41

## 2021-12-16 RX ADMIN — Medication SCH MG: at 08:20

## 2021-12-16 RX ADMIN — Medication SCH EACH: at 17:33

## 2021-12-16 RX ADMIN — INSULIN HUMAN PRN UNITS: 100 INJECTION, SOLUTION PARENTERAL at 12:31

## 2021-12-16 RX ADMIN — METFORMIN HYDROCHLORIDE SCH MG: 500 TABLET, FILM COATED ORAL at 17:13

## 2021-12-16 RX ADMIN — INSULIN HUMAN PRN UNITS: 100 INJECTION, SOLUTION PARENTERAL at 17:34

## 2021-12-16 RX ADMIN — Medication SCH EACH: at 08:22

## 2021-12-16 RX ADMIN — Medication SCH EACH: at 12:29

## 2021-12-16 RX ADMIN — DEXAMETHASONE SODIUM PHOSPHATE SCH MG: 10 INJECTION INTRAMUSCULAR; INTRAVENOUS at 23:35

## 2021-12-16 RX ADMIN — DEXAMETHASONE SODIUM PHOSPHATE SCH MG: 10 INJECTION INTRAMUSCULAR; INTRAVENOUS at 12:03

## 2021-12-16 RX ADMIN — GUAIFENESIN AND CODEINE PHOSPHATE PRN ML: 100; 10 SOLUTION ORAL at 10:51

## 2021-12-16 RX ADMIN — METFORMIN HYDROCHLORIDE SCH MG: 500 TABLET, FILM COATED ORAL at 08:20

## 2021-12-16 RX ADMIN — HUMAN INSULIN PRN UNIT: 100 INJECTION, SOLUTION SUBCUTANEOUS at 23:40

## 2021-12-17 VITALS — DIASTOLIC BLOOD PRESSURE: 69 MMHG | SYSTOLIC BLOOD PRESSURE: 132 MMHG

## 2021-12-17 VITALS — DIASTOLIC BLOOD PRESSURE: 59 MMHG | SYSTOLIC BLOOD PRESSURE: 116 MMHG

## 2021-12-17 VITALS — SYSTOLIC BLOOD PRESSURE: 117 MMHG | DIASTOLIC BLOOD PRESSURE: 61 MMHG

## 2021-12-17 VITALS — DIASTOLIC BLOOD PRESSURE: 64 MMHG | SYSTOLIC BLOOD PRESSURE: 121 MMHG

## 2021-12-17 VITALS — SYSTOLIC BLOOD PRESSURE: 133 MMHG | DIASTOLIC BLOOD PRESSURE: 75 MMHG

## 2021-12-17 VITALS — SYSTOLIC BLOOD PRESSURE: 120 MMHG | DIASTOLIC BLOOD PRESSURE: 57 MMHG

## 2021-12-17 LAB
BASOPHILS # BLD AUTO: 0 K/UL (ref 0–0.2)
BASOPHILS NFR BLD AUTO: 0.1 % (ref 0–2)
BUN SERPL-MCNC: 38 MG/DL (ref 7–18)
CALCIUM SERPL-MCNC: 7.9 MG/DL (ref 8.5–10.1)
CHLORIDE SERPL-SCNC: 101 MMOL/L (ref 98–107)
CO2 SERPL-SCNC: 27 MMOL/L (ref 21–32)
CREAT SERPL-MCNC: 1.4 MG/DL (ref 0.6–1.3)
EOSINOPHIL NFR BLD AUTO: 2.5 % (ref 0–6)
EOSINOPHIL NFR BLD MANUAL: 4 % (ref 0–4)
GLUCOSE SERPL-MCNC: 102 MG/DL (ref 74–106)
HCT VFR BLD AUTO: 42 % (ref 39–51)
HGB BLD-MCNC: 14 G/DL (ref 13.5–17.5)
LYMPHOCYTES NFR BLD AUTO: 0.7 K/UL (ref 0.8–4.8)
LYMPHOCYTES NFR BLD AUTO: 5.9 % (ref 20–44)
LYMPHOCYTES NFR BLD MANUAL: 2 % (ref 16–48)
MCHC RBC AUTO-ENTMCNC: 33 G/DL (ref 31–36)
MCV RBC AUTO: 86 FL (ref 80–96)
MONOCYTES NFR BLD AUTO: 0.3 K/UL (ref 0.1–1.3)
MONOCYTES NFR BLD AUTO: 2.3 % (ref 2–12)
MONOCYTES NFR BLD MANUAL: 2 % (ref 0–11)
NEUTROPHILS # BLD AUTO: 10.2 K/UL (ref 1.8–8.9)
NEUTROPHILS NFR BLD AUTO: 89.2 % (ref 43–81)
NEUTS BAND NFR BLD MANUAL: 5 % (ref 0–5)
NEUTS SEG NFR BLD MANUAL: 80 % (ref 42–76)
PLATELET # BLD AUTO: 476 K/UL (ref 150–450)
POTASSIUM SERPL-SCNC: 4 MMOL/L (ref 3.5–5.1)
RBC # BLD AUTO: 4.88 MIL/UL (ref 4.5–6)
SODIUM SERPL-SCNC: 135 MMOL/L (ref 136–145)
VARIANT LYMPHS NFR BLD MANUAL: 7 % (ref 0–0)
WBC NRBC COR # BLD AUTO: 11.4 K/UL (ref 4.3–11)

## 2021-12-17 RX ADMIN — INSULIN HUMAN PRN UNITS: 100 INJECTION, SOLUTION PARENTERAL at 18:00

## 2021-12-17 RX ADMIN — ENOXAPARIN SODIUM SCH MG: 40 INJECTION SUBCUTANEOUS at 21:25

## 2021-12-17 RX ADMIN — INSULIN HUMAN PRN UNITS: 100 INJECTION, SOLUTION PARENTERAL at 11:45

## 2021-12-17 RX ADMIN — Medication SCH EACH: at 11:45

## 2021-12-17 RX ADMIN — METFORMIN HYDROCHLORIDE SCH MG: 500 TABLET, FILM COATED ORAL at 17:52

## 2021-12-17 RX ADMIN — Medication SCH EACH: at 22:26

## 2021-12-17 RX ADMIN — Medication SCH MG: at 17:52

## 2021-12-17 RX ADMIN — DEXAMETHASONE SODIUM PHOSPHATE SCH MG: 10 INJECTION INTRAMUSCULAR; INTRAVENOUS at 17:52

## 2021-12-17 RX ADMIN — INSULIN HUMAN PRN UNITS: 100 INJECTION, SOLUTION PARENTERAL at 07:42

## 2021-12-17 RX ADMIN — Medication SCH EACH: at 07:41

## 2021-12-17 RX ADMIN — DEXAMETHASONE SODIUM PHOSPHATE SCH MG: 10 INJECTION INTRAMUSCULAR; INTRAVENOUS at 05:12

## 2021-12-17 RX ADMIN — ENOXAPARIN SODIUM SCH MG: 40 INJECTION SUBCUTANEOUS at 10:37

## 2021-12-17 RX ADMIN — Medication SCH MG: at 08:58

## 2021-12-17 RX ADMIN — Medication SCH EACH: at 17:59

## 2021-12-17 RX ADMIN — METFORMIN HYDROCHLORIDE SCH MG: 500 TABLET, FILM COATED ORAL at 08:58

## 2021-12-17 RX ADMIN — AMLODIPINE BESYLATE SCH MG: 5 TABLET ORAL at 08:58

## 2021-12-17 RX ADMIN — GUAIFENESIN AND CODEINE PHOSPHATE PRN ML: 100; 10 SOLUTION ORAL at 12:58

## 2021-12-18 VITALS — SYSTOLIC BLOOD PRESSURE: 105 MMHG | DIASTOLIC BLOOD PRESSURE: 63 MMHG

## 2021-12-18 VITALS — SYSTOLIC BLOOD PRESSURE: 129 MMHG | DIASTOLIC BLOOD PRESSURE: 61 MMHG

## 2021-12-18 VITALS — DIASTOLIC BLOOD PRESSURE: 70 MMHG | SYSTOLIC BLOOD PRESSURE: 118 MMHG

## 2021-12-18 VITALS — SYSTOLIC BLOOD PRESSURE: 116 MMHG | DIASTOLIC BLOOD PRESSURE: 67 MMHG

## 2021-12-18 VITALS — DIASTOLIC BLOOD PRESSURE: 93 MMHG | SYSTOLIC BLOOD PRESSURE: 163 MMHG

## 2021-12-18 VITALS — DIASTOLIC BLOOD PRESSURE: 68 MMHG | SYSTOLIC BLOOD PRESSURE: 139 MMHG

## 2021-12-18 LAB
ALBUMIN SERPL BCP-MCNC: 2.8 G/DL (ref 3.4–5)
ALP SERPL-CCNC: 51 U/L (ref 46–116)
ALT SERPL W P-5'-P-CCNC: 100 U/L (ref 12–78)
AST SERPL W P-5'-P-CCNC: 35 U/L (ref 15–37)
BASOPHILS # BLD AUTO: 0 K/UL (ref 0–0.2)
BASOPHILS NFR BLD AUTO: 0 % (ref 0–2)
BILIRUB SERPL-MCNC: 0.5 MG/DL (ref 0.2–1)
BUN SERPL-MCNC: 31 MG/DL (ref 7–18)
CALCIUM SERPL-MCNC: 8.1 MG/DL (ref 8.5–10.1)
CHLORIDE SERPL-SCNC: 101 MMOL/L (ref 98–107)
CO2 SERPL-SCNC: 27 MMOL/L (ref 21–32)
CREAT SERPL-MCNC: 1.4 MG/DL (ref 0.6–1.3)
CRP SERPL-MCNC: < 0.2 MG/DL (ref 0–0.9)
EOSINOPHIL NFR BLD AUTO: 1.3 % (ref 0–6)
FERRITIN SERPL-MCNC: 870 NG/ML (ref 8–388)
GLUCOSE SERPL-MCNC: 92 MG/DL (ref 74–106)
HCT VFR BLD AUTO: 43 % (ref 39–51)
HGB BLD-MCNC: 14.5 G/DL (ref 13.5–17.5)
LYMPHOCYTES NFR BLD AUTO: 0.3 K/UL (ref 0.8–4.8)
LYMPHOCYTES NFR BLD AUTO: 1.9 % (ref 20–44)
MCHC RBC AUTO-ENTMCNC: 34 G/DL (ref 31–36)
MCV RBC AUTO: 86 FL (ref 80–96)
MONOCYTES NFR BLD AUTO: 0.3 K/UL (ref 0.1–1.3)
MONOCYTES NFR BLD AUTO: 2.4 % (ref 2–12)
NEUTROPHILS # BLD AUTO: 12.9 K/UL (ref 1.8–8.9)
NEUTROPHILS NFR BLD AUTO: 94.4 % (ref 43–81)
PLATELET # BLD AUTO: 380 K/UL (ref 150–450)
POTASSIUM SERPL-SCNC: 4.7 MMOL/L (ref 3.5–5.1)
PROT SERPL-MCNC: 5.7 G/DL (ref 6.4–8.2)
RBC # BLD AUTO: 4.99 MIL/UL (ref 4.5–6)
SODIUM SERPL-SCNC: 135 MMOL/L (ref 136–145)
WBC NRBC COR # BLD AUTO: 13.7 K/UL (ref 4.3–11)

## 2021-12-18 RX ADMIN — Medication SCH EACH: at 07:58

## 2021-12-18 RX ADMIN — Medication SCH EACH: at 13:15

## 2021-12-18 RX ADMIN — Medication SCH EACH: at 21:28

## 2021-12-18 RX ADMIN — Medication SCH MG: at 09:44

## 2021-12-18 RX ADMIN — ENOXAPARIN SODIUM SCH MG: 40 INJECTION SUBCUTANEOUS at 21:28

## 2021-12-18 RX ADMIN — GUAIFENESIN AND CODEINE PHOSPHATE PRN ML: 100; 10 SOLUTION ORAL at 09:57

## 2021-12-18 RX ADMIN — ENOXAPARIN SODIUM SCH MG: 40 INJECTION SUBCUTANEOUS at 09:48

## 2021-12-18 RX ADMIN — DEXAMETHASONE SODIUM PHOSPHATE SCH MG: 10 INJECTION INTRAMUSCULAR; INTRAVENOUS at 17:28

## 2021-12-18 RX ADMIN — DEXAMETHASONE SODIUM PHOSPHATE SCH MG: 10 INJECTION INTRAMUSCULAR; INTRAVENOUS at 04:48

## 2021-12-18 RX ADMIN — METFORMIN HYDROCHLORIDE SCH MG: 500 TABLET, FILM COATED ORAL at 09:44

## 2021-12-18 RX ADMIN — AMLODIPINE BESYLATE SCH MG: 5 TABLET ORAL at 09:45

## 2021-12-18 RX ADMIN — Medication SCH EACH: at 17:29

## 2021-12-18 RX ADMIN — Medication SCH MG: at 17:28

## 2021-12-18 RX ADMIN — GUAIFENESIN AND CODEINE PHOSPHATE PRN ML: 100; 10 SOLUTION ORAL at 21:15

## 2021-12-18 RX ADMIN — HUMAN INSULIN PRN UNIT: 100 INJECTION, SOLUTION SUBCUTANEOUS at 21:33

## 2021-12-19 VITALS — DIASTOLIC BLOOD PRESSURE: 70 MMHG | SYSTOLIC BLOOD PRESSURE: 118 MMHG

## 2021-12-19 VITALS — DIASTOLIC BLOOD PRESSURE: 82 MMHG | SYSTOLIC BLOOD PRESSURE: 139 MMHG

## 2021-12-19 VITALS — SYSTOLIC BLOOD PRESSURE: 139 MMHG | DIASTOLIC BLOOD PRESSURE: 76 MMHG

## 2021-12-19 VITALS — SYSTOLIC BLOOD PRESSURE: 123 MMHG | DIASTOLIC BLOOD PRESSURE: 70 MMHG

## 2021-12-19 VITALS — DIASTOLIC BLOOD PRESSURE: 76 MMHG | SYSTOLIC BLOOD PRESSURE: 113 MMHG

## 2021-12-19 VITALS — SYSTOLIC BLOOD PRESSURE: 116 MMHG | DIASTOLIC BLOOD PRESSURE: 77 MMHG

## 2021-12-19 RX ADMIN — HUMAN INSULIN PRN UNIT: 100 INJECTION, SOLUTION SUBCUTANEOUS at 21:25

## 2021-12-19 RX ADMIN — ENOXAPARIN SODIUM SCH MG: 40 INJECTION SUBCUTANEOUS at 20:28

## 2021-12-19 RX ADMIN — GUAIFENESIN AND CODEINE PHOSPHATE PRN ML: 100; 10 SOLUTION ORAL at 20:25

## 2021-12-19 RX ADMIN — ENOXAPARIN SODIUM SCH MG: 40 INJECTION SUBCUTANEOUS at 09:32

## 2021-12-19 RX ADMIN — INSULIN HUMAN PRN UNITS: 100 INJECTION, SOLUTION PARENTERAL at 12:41

## 2021-12-19 RX ADMIN — Medication SCH EACH: at 21:22

## 2021-12-19 RX ADMIN — AMLODIPINE BESYLATE SCH MG: 5 TABLET ORAL at 09:30

## 2021-12-19 RX ADMIN — GUAIFENESIN AND CODEINE PHOSPHATE PRN ML: 100; 10 SOLUTION ORAL at 09:37

## 2021-12-19 RX ADMIN — DEXAMETHASONE SODIUM PHOSPHATE SCH MG: 10 INJECTION INTRAMUSCULAR; INTRAVENOUS at 20:26

## 2021-12-19 RX ADMIN — Medication SCH EACH: at 17:20

## 2021-12-19 RX ADMIN — INSULIN HUMAN PRN UNITS: 100 INJECTION, SOLUTION PARENTERAL at 09:30

## 2021-12-19 RX ADMIN — Medication SCH MG: at 17:20

## 2021-12-19 RX ADMIN — Medication SCH EACH: at 12:29

## 2021-12-19 RX ADMIN — DEXAMETHASONE SODIUM PHOSPHATE SCH MG: 10 INJECTION INTRAMUSCULAR; INTRAVENOUS at 04:21

## 2021-12-19 RX ADMIN — Medication SCH MG: at 09:31

## 2021-12-19 RX ADMIN — Medication SCH EACH: at 08:05

## 2021-12-19 RX ADMIN — GUAIFENESIN AND CODEINE PHOSPHATE PRN ML: 100; 10 SOLUTION ORAL at 15:04

## 2021-12-19 RX ADMIN — DEXAMETHASONE SODIUM PHOSPHATE SCH MG: 10 INJECTION INTRAMUSCULAR; INTRAVENOUS at 17:21

## 2021-12-20 VITALS — DIASTOLIC BLOOD PRESSURE: 78 MMHG | SYSTOLIC BLOOD PRESSURE: 125 MMHG

## 2021-12-20 VITALS — DIASTOLIC BLOOD PRESSURE: 76 MMHG | SYSTOLIC BLOOD PRESSURE: 118 MMHG

## 2021-12-20 VITALS — SYSTOLIC BLOOD PRESSURE: 125 MMHG | DIASTOLIC BLOOD PRESSURE: 75 MMHG

## 2021-12-20 VITALS — SYSTOLIC BLOOD PRESSURE: 116 MMHG | DIASTOLIC BLOOD PRESSURE: 54 MMHG

## 2021-12-20 VITALS — DIASTOLIC BLOOD PRESSURE: 70 MMHG | SYSTOLIC BLOOD PRESSURE: 101 MMHG

## 2021-12-20 VITALS — DIASTOLIC BLOOD PRESSURE: 77 MMHG | SYSTOLIC BLOOD PRESSURE: 135 MMHG

## 2021-12-20 RX ADMIN — HUMAN INSULIN PRN UNIT: 100 INJECTION, SOLUTION SUBCUTANEOUS at 20:53

## 2021-12-20 RX ADMIN — Medication SCH MG: at 16:30

## 2021-12-20 RX ADMIN — INSULIN HUMAN PRN UNITS: 100 INJECTION, SOLUTION PARENTERAL at 11:06

## 2021-12-20 RX ADMIN — GUAIFENESIN AND CODEINE PHOSPHATE PRN ML: 100; 10 SOLUTION ORAL at 20:28

## 2021-12-20 RX ADMIN — Medication SCH EACH: at 20:53

## 2021-12-20 RX ADMIN — Medication SCH EACH: at 16:37

## 2021-12-20 RX ADMIN — AMLODIPINE BESYLATE SCH MG: 5 TABLET ORAL at 08:28

## 2021-12-20 RX ADMIN — INSULIN HUMAN PRN UNITS: 100 INJECTION, SOLUTION PARENTERAL at 07:42

## 2021-12-20 RX ADMIN — Medication SCH EACH: at 07:42

## 2021-12-20 RX ADMIN — Medication SCH MG: at 08:29

## 2021-12-20 RX ADMIN — INSULIN HUMAN PRN UNITS: 100 INJECTION, SOLUTION PARENTERAL at 16:37

## 2021-12-20 RX ADMIN — Medication SCH EACH: at 11:05

## 2021-12-20 RX ADMIN — ENOXAPARIN SODIUM SCH MG: 40 INJECTION SUBCUTANEOUS at 20:29

## 2021-12-20 RX ADMIN — ENOXAPARIN SODIUM SCH MG: 40 INJECTION SUBCUTANEOUS at 08:29

## 2021-12-21 VITALS — DIASTOLIC BLOOD PRESSURE: 72 MMHG | SYSTOLIC BLOOD PRESSURE: 123 MMHG

## 2021-12-21 VITALS — DIASTOLIC BLOOD PRESSURE: 72 MMHG | SYSTOLIC BLOOD PRESSURE: 114 MMHG

## 2021-12-21 VITALS — SYSTOLIC BLOOD PRESSURE: 107 MMHG | DIASTOLIC BLOOD PRESSURE: 65 MMHG

## 2021-12-21 VITALS — SYSTOLIC BLOOD PRESSURE: 122 MMHG | DIASTOLIC BLOOD PRESSURE: 70 MMHG

## 2021-12-21 VITALS — DIASTOLIC BLOOD PRESSURE: 56 MMHG | SYSTOLIC BLOOD PRESSURE: 97 MMHG

## 2021-12-21 VITALS — DIASTOLIC BLOOD PRESSURE: 51 MMHG | SYSTOLIC BLOOD PRESSURE: 98 MMHG

## 2021-12-21 LAB
ALBUMIN SERPL BCP-MCNC: 2.5 G/DL (ref 3.4–5)
ALP SERPL-CCNC: 45 U/L (ref 46–116)
ALT SERPL W P-5'-P-CCNC: 62 U/L (ref 12–78)
AST SERPL W P-5'-P-CCNC: 24 U/L (ref 15–37)
BASOPHILS # BLD AUTO: 0 K/UL (ref 0–0.2)
BASOPHILS NFR BLD AUTO: 0.2 % (ref 0–2)
BILIRUB SERPL-MCNC: 0.5 MG/DL (ref 0.2–1)
BUN SERPL-MCNC: 28 MG/DL (ref 7–18)
CALCIUM SERPL-MCNC: 7.8 MG/DL (ref 8.5–10.1)
CHLORIDE SERPL-SCNC: 103 MMOL/L (ref 98–107)
CO2 SERPL-SCNC: 25 MMOL/L (ref 21–32)
CREAT SERPL-MCNC: 1.2 MG/DL (ref 0.6–1.3)
CRP SERPL-MCNC: < 0.2 MG/DL (ref 0–0.9)
EOSINOPHIL NFR BLD AUTO: 1.3 % (ref 0–6)
EOSINOPHIL NFR BLD MANUAL: 2 % (ref 0–4)
GLUCOSE SERPL-MCNC: 81 MG/DL (ref 74–106)
HCT VFR BLD AUTO: 41 % (ref 39–51)
HGB BLD-MCNC: 13.7 G/DL (ref 13.5–17.5)
LYMPHOCYTES NFR BLD AUTO: 0.7 K/UL (ref 0.8–4.8)
LYMPHOCYTES NFR BLD AUTO: 6.5 % (ref 20–44)
LYMPHOCYTES NFR BLD MANUAL: 10 % (ref 16–48)
MCHC RBC AUTO-ENTMCNC: 34 G/DL (ref 31–36)
MCV RBC AUTO: 87 FL (ref 80–96)
METAMYELOCYTES NFR BLD MANUAL: 1 % (ref 0–0)
MONOCYTES NFR BLD AUTO: 0.5 K/UL (ref 0.1–1.3)
MONOCYTES NFR BLD AUTO: 4.5 % (ref 2–12)
MONOCYTES NFR BLD MANUAL: 4 % (ref 0–11)
MYELOCYTES NFR BLD MANUAL: 1 % (ref 0–0)
NEUTROPHILS # BLD AUTO: 9.3 K/UL (ref 1.8–8.9)
NEUTROPHILS NFR BLD AUTO: 87.5 % (ref 43–81)
NEUTS SEG NFR BLD MANUAL: 82 % (ref 42–76)
PLATELET # BLD AUTO: 365 K/UL (ref 150–450)
POTASSIUM SERPL-SCNC: 4 MMOL/L (ref 3.5–5.1)
PROT SERPL-MCNC: 4.9 G/DL (ref 6.4–8.2)
RBC # BLD AUTO: 4.7 MIL/UL (ref 4.5–6)
SODIUM SERPL-SCNC: 135 MMOL/L (ref 136–145)
WBC NRBC COR # BLD AUTO: 10.6 K/UL (ref 4.3–11)

## 2021-12-21 RX ADMIN — Medication SCH EACH: at 07:35

## 2021-12-21 RX ADMIN — AMLODIPINE BESYLATE SCH MG: 5 TABLET ORAL at 08:49

## 2021-12-21 RX ADMIN — Medication SCH EACH: at 21:46

## 2021-12-21 RX ADMIN — DEXAMETHASONE SODIUM PHOSPHATE SCH MG: 10 INJECTION INTRAMUSCULAR; INTRAVENOUS at 08:48

## 2021-12-21 RX ADMIN — Medication SCH MG: at 08:48

## 2021-12-21 RX ADMIN — GUAIFENESIN AND CODEINE PHOSPHATE PRN ML: 100; 10 SOLUTION ORAL at 13:24

## 2021-12-21 RX ADMIN — ENOXAPARIN SODIUM SCH MG: 40 INJECTION SUBCUTANEOUS at 21:29

## 2021-12-21 RX ADMIN — INSULIN HUMAN PRN UNITS: 100 INJECTION, SOLUTION PARENTERAL at 07:35

## 2021-12-21 RX ADMIN — Medication SCH EACH: at 16:35

## 2021-12-21 RX ADMIN — ENOXAPARIN SODIUM SCH MG: 40 INJECTION SUBCUTANEOUS at 08:49

## 2021-12-21 RX ADMIN — Medication SCH EACH: at 11:12

## 2021-12-21 RX ADMIN — INSULIN HUMAN PRN UNITS: 100 INJECTION, SOLUTION PARENTERAL at 11:12

## 2021-12-21 RX ADMIN — INSULIN HUMAN PRN UNITS: 100 INJECTION, SOLUTION PARENTERAL at 16:35

## 2021-12-21 RX ADMIN — Medication SCH MG: at 16:29

## 2021-12-21 RX ADMIN — HUMAN INSULIN PRN UNIT: 100 INJECTION, SOLUTION SUBCUTANEOUS at 21:49

## 2021-12-22 VITALS — DIASTOLIC BLOOD PRESSURE: 63 MMHG | SYSTOLIC BLOOD PRESSURE: 96 MMHG

## 2021-12-22 VITALS — DIASTOLIC BLOOD PRESSURE: 69 MMHG | SYSTOLIC BLOOD PRESSURE: 103 MMHG

## 2021-12-22 VITALS — SYSTOLIC BLOOD PRESSURE: 123 MMHG | DIASTOLIC BLOOD PRESSURE: 59 MMHG

## 2021-12-22 VITALS — DIASTOLIC BLOOD PRESSURE: 66 MMHG | SYSTOLIC BLOOD PRESSURE: 123 MMHG

## 2021-12-22 VITALS — DIASTOLIC BLOOD PRESSURE: 62 MMHG | SYSTOLIC BLOOD PRESSURE: 92 MMHG

## 2021-12-22 VITALS — DIASTOLIC BLOOD PRESSURE: 64 MMHG | SYSTOLIC BLOOD PRESSURE: 98 MMHG

## 2021-12-22 RX ADMIN — Medication SCH EACH: at 07:38

## 2021-12-22 RX ADMIN — DEXAMETHASONE SODIUM PHOSPHATE SCH MG: 10 INJECTION INTRAMUSCULAR; INTRAVENOUS at 08:10

## 2021-12-22 RX ADMIN — ENOXAPARIN SODIUM SCH MG: 40 INJECTION SUBCUTANEOUS at 08:12

## 2021-12-22 RX ADMIN — Medication SCH MG: at 08:10

## 2021-12-22 RX ADMIN — Medication SCH EACH: at 12:16

## 2021-12-22 RX ADMIN — Medication SCH MG: at 16:12

## 2021-12-22 RX ADMIN — Medication SCH EACH: at 16:49

## 2021-12-22 RX ADMIN — ENOXAPARIN SODIUM SCH MG: 40 INJECTION SUBCUTANEOUS at 21:13

## 2021-12-22 RX ADMIN — Medication SCH EACH: at 21:24

## 2021-12-23 VITALS — DIASTOLIC BLOOD PRESSURE: 57 MMHG | SYSTOLIC BLOOD PRESSURE: 108 MMHG

## 2021-12-23 VITALS — DIASTOLIC BLOOD PRESSURE: 73 MMHG | SYSTOLIC BLOOD PRESSURE: 112 MMHG

## 2021-12-23 VITALS — DIASTOLIC BLOOD PRESSURE: 69 MMHG | SYSTOLIC BLOOD PRESSURE: 107 MMHG

## 2021-12-23 LAB
ALBUMIN SERPL BCP-MCNC: 2.5 G/DL (ref 3.4–5)
ALP SERPL-CCNC: 40 U/L (ref 46–116)
ALT SERPL W P-5'-P-CCNC: 102 U/L (ref 12–78)
AST SERPL W P-5'-P-CCNC: 34 U/L (ref 15–37)
BILIRUB SERPL-MCNC: 0.5 MG/DL (ref 0.2–1)
BUN SERPL-MCNC: 24 MG/DL (ref 7–18)
CALCIUM SERPL-MCNC: 7.8 MG/DL (ref 8.5–10.1)
CHLORIDE SERPL-SCNC: 102 MMOL/L (ref 98–107)
CO2 SERPL-SCNC: 30 MMOL/L (ref 21–32)
CREAT SERPL-MCNC: 1.2 MG/DL (ref 0.6–1.3)
CRP SERPL-MCNC: < 0.2 MG/DL (ref 0–0.9)
GLUCOSE SERPL-MCNC: 77 MG/DL (ref 74–106)
POTASSIUM SERPL-SCNC: 4 MMOL/L (ref 3.5–5.1)
PROT SERPL-MCNC: 5.5 G/DL (ref 6.4–8.2)
SODIUM SERPL-SCNC: 137 MMOL/L (ref 136–145)

## 2021-12-23 RX ADMIN — ENOXAPARIN SODIUM SCH MG: 40 INJECTION SUBCUTANEOUS at 08:49

## 2021-12-23 RX ADMIN — GUAIFENESIN AND CODEINE PHOSPHATE PRN ML: 100; 10 SOLUTION ORAL at 08:55

## 2021-12-23 RX ADMIN — Medication SCH EACH: at 07:53

## 2021-12-23 RX ADMIN — Medication SCH MG: at 08:49

## 2021-12-23 NOTE — NUR
ALEXUS RN NOTE

 PER DR LOGAN OK TO PLACE ON ON SIMPLE MASK ,KEEP  SATURATION 90%
ALEXUS RN NOTE 

 C\O COUGH ROBITUSSIN PO GIVEN DR CROSS AT BESIDE UPDATED PATIENT [place] OK TO CHANGE 
ROBITUSSIN PO Q4 HOUR PRN ,ORDER CARRIED OUT
ALEXUS RN NOTE 

 PATIENT IN BED ALERT ORIENTED, ON 15 L NONREBREATHER MASK SATURATION 93% AT THIS TIME, ON 
TELE MONITOR SR HR 65,BED IN LOWEST AND LOCKED POSITION ,RT AC HL INTACT AND FLUSHED WELL, 
WITH SLIGHT SOB NOTED AT THIS TIME, WILL CONT TO MONITOR CLOSELY
BLOOD COLLECTED AND SENT TO LAB
CALLED NURSING SUP REGARDING PT BED
CALLED [facility]  AND WAS NOTIFIED THAT [facility] DID NOT HAVE 
ANY BEDS AVAILABLE.
CALLED [facility] [phone] AND WAS NOTIFIED THAT THE REQUEST FOR THE PORTABLE 
OXYGEN WAS ACCEPTED. THEN CALLED [facility] (DELIVERING O2 TANK) AT 
[phone]  AND WAS NOTIFIED THAT THERE IS NO CURRENT ETA FOR THE DELIVERY 
BUT DID LEAVE ER NUMBER TO NOTIFY ONCE AN ETA HAS BEEN AQUIRED.
CALLED [facility] AND WAS NOTIFIED THAT THE PROCESS FOR PT ADMISSION TO [facility] WILL BEGIN. AND WILL RECIEVE A CALL BACK WITH ANY UPDATES
FAXED CLINICALS TO [facility]/O JULIANNE (FAX: [phone])
NOTIFIED DR. CROSS THAT THE PT WILL BE STAYING HERE.
PATIENT BIBWIFE C/O COUGH, FEVER AND DIFFICULTY BREATHING. PATIENT TESTED + FOR 
COVID

3 DAYS AGO. ON Z PACK. TOOK MOTRIN 800MG PO AT 2000. PATIENT IS A/O X 4, RR 
EVEN AND UNLABORED, NO SOB NOTED. PATIENT CONNECTED TO CARDIAC MONITOR AND POX.
PATIENT RESTING IN BED, VSS, IV SITE INTACT FLUSHING WELL RAC 20G. WILL 
CONTINUE TO MONITOR.
PATIENT SAT ROOM AIR AT REST 86% NEED HOME OXYGEN PER DR. CROSS,CASE MANAGEMENT NOTIFIED.
PT TRANSFERRED TO [facility] VIA ACLS PROTOCOL. ALL BELONGINGS WITH PT
REPORT GIVEN TO NURSE VILLASENOR
RN AM OPENING NOTE  



RECEIVED PATIENT ASLEEP, RESPONDS TO NAME AND TOUCH, A/O X4. ON 4L O2 NASAL CANULA. NO 
DISTRESS OR DISCOMFORT. 98% O2 SAT. RAC #20 G, FLUSHES WELL SITE CLEAR. POC DISCUSSED, 
VERBALIZED UNDERSTANDING. BED REST FOR NOW. BED IN LOW POSITION, LOCKED, AND CALL LIGHT 
WITHIN REACH. WILL CONT TO MONITOR.
RN CLOSING  NOTE



PATIENT RESTING IN BED ALERT AND ORIENTED X4. PT IS ON 10L VIA SIMPLE FACE MASK O2 SAT 90% 
SHOWING NO S/S OF RESP DISTRESS OR SOB. PATIENT IS SHOWING NO UNLABORED BREATHING. ALL 
ISOLATION PRECAUTIONS TAKEN, PT IN COVID ISOLATION. IV ACCESS ON THE RIGHT UPPER ARM 
MIDLINE. FLUSHED, PATENT, AND INTACT WITH NO SIGNS OF INFILTRATION.PATIENT ON TELE MONITOR 
WITH SR AND HR OF 57. ALL SAFETY MEASURES CHECKED, CALL LIGHT WITHIN REACH. BED ALARM ON. 
BED LOCKED AND IN LOWEST POSITION.WILL ENDORSE PATIENT CARE TO ONCOMING MORNING NURSE.
RN CLOSING  NOTES;



PATIENT  REMAIN SLEEPING, EASILY AROUSABLE,  ALERT, ORIENTED X4, VERBALLY RESPONSIVE. ON 
4L/MIN VIA N/C, O2 SAT 96%. BREATHING EVEN AND UNLABORED. DUE MEDS GIVEN AS ORDER.  NO C/O 
PAIN OR DISCOMFORT. NO ACUTE DISTRESS.  YARITZA MIDLINE INTACT AND PATENT.  NO S/S OF 
INFILTRATION. COOPERATIVE WITH CARE. BLOOD SUGAR CHECKED 3 TIMES, AT 7:30 WAS 71, AT 1200 
WAS 99, AT 1700 WAS 95, ORANGE JUICE AND FOOD PROVIDED AT EACH TIME. THERE WAS NO S/SX OF 
HYPOGLYCEMIA NOTED.  ALL SAFETY MEASURES RENDERED, BED IN LOWEST POSITION AND LOCKED, SIDE 
RAILS X3 UP. PLACE CALL LIGHT WITHIN REACH. WILL ENDORSE TO MORNING SHIFT NURSE.
RN CLOSING  NOTES;



PATIENT IN SLEEPING, EASILY AROUSABLE,  ALERT, ORIENTED X4, VERBALLY RESPONSIVE. ON 4L/MIN 
VIA N/C, O2 SAT 96%. BREATHING EVEN AND UNLABORED. DUE MEDS GIVEN AS ORDER.  NO C/O PAIN OR 
DISCOMFORT. NO ACUTE DISTRESS.  YARITZA MIDLINE INTACT AND PATENT.  NO S/S OF INFILTRATION. 
COOPERATIVE WITH CARE. ALL SAFETY MEASURES RENDERED, BED IN LOWEST POSITION AND LOCKED, SIDE 
RAILS X3 UP. PLACE CALL LIGHT WITHIN REACH. WILL ENDORSE TO MORNING SHIFT NURSE.
RN CLOSING NOTE



CA REMAINS RESTING IN BED ALERT AND ORIENTED X4. PT IS ON 10L VIA SIMPLE FACE MASK O2 SAT 
90% SHOWING NO S/S OF RESP DISTRESS OR SOB. BREATHING EVEN AND UNLABORED. IV ACCESS NOTED ON 
RIGHT UPPER ARM MIDLINE. FLUSHED, PATENT, AND INTACT WITH NO SIGNS OF INFILTRATION. NO 
SIGNIFICANT CHANGE DURING THE DAY, PT KEEP COMFORTABLE AND CLEAN, ALL NEEDS MET DURING 
SHIFT.  ALL SAFETY MEASURES IMPLEMENTED. CALL LIGHT WITHIN REACH. BED ALARM ON. BED LOCKED 
AND IN LOWEST POSITION. WILL ENDORSE TO RN NIGHT SHIFT
RN CLOSING NOTE



PATIENT ASLEEP AROUSES TO VERBAL STIMULI, ON  10 L NON REBREATHER MASK SAT > 94% TOLERATING 
WELL, WITH EPISODES OF DESATURATION ONLY WHEN HE MOVES, BUT QUICKLY IMPROVES TO 90S WITH THE 
HIGHEST 98%, SINUS TASHA ON THE MONITOR MOST OF THE NIGHT, WITH HR 50-60S,  SOME EPISODES OF 
HR IN MID 40S, BUT NOT SUSTAINED, YARITZA MIDLINE S/L, ENCOURAGED PRONE POSITION, BUT PATIENT 
PREFER LEFT SIDED,  BED IS LOCKED IN LOWEST POSITION X2 GUARD RAILS, CALL LIGHT W/I REACH, 
REPORT GIVEN TO DAUGHTER,  WILL ENDORSE CONTINUITY OF CARE TO ONCOMING  NURSE.
RN CLOSING NOTE



PATIENT REMAINS IN BED, AWAKE, A&OX4. PATIENT ON 15L NRB SATURATING AT 98% WITH NO SIGNS OF 
LABORED BREATHING AT THIS TIME. RIGHT AC 20G PIV IN PLACE, PATENT WITH NO SIGNS OF 
INFILTRATION. ALL NEEDS ATTENDED DURING SHIFT. BED LOCKED AND IN LOWEST POSITION, CALL LIGHT 
WITHIN REACH, 2 SIDE RAILS UP. NO SIGNS OF DISTRESS NOTED AT THIS TIME. WILL ENDORSE TO 
NIGHT SHIFT NURSE.
RN CLOSING NOTE



PATIENT REMAINS IN BED, AWAKE, A&OX4. PATIENT ON 5L O2 NC WITH NO SIGNS OF LABORED BREATHING 
AT THIS TIME. RIGHT AC 20G IN PLACE, PATENT WITH NO SIGNS OF INFILTRATION. NO SIGNS OF 
DISTRESS NOTED AT THIS TIME. ALL NEEDS ATTENDED DURING SHIFT. BED LOCKED AND IN LOWEST 
POSITION, CALL LIGHT WITHIN REACH, 3 SIDE RAILS UP. WILL ENDORSE TO NIGHT SHIFT NURSE.
RN CLOSING NOTE



PT IS RESTING IN BED WITH HOB SEMI FOWLERS EATING DINNER. PT IS ON 10 L NON REBREATHER MASK 
SAT 95% TOLERATING WELL WITH NO SIGNS OF LABORED BREATHING OR DISTRESS. PT IS AOX4 ENGLISH 
SPEAKING. ON TELE MONITOR SR. PT IS BR, REGULAR DIET, IV ACCESS YARITZA MIDLINE. PLAN IS TO 
CONTINUE TO TOLERATE PT O2 AS TOLERATED. PT HAD NO BM THIS SHIFT. BED IS LOCKED IN LOWEST 
POSITION X2 GUARD RAILS, ALL HOSPITAL PROTOCOLS ARE IN PLACE. ALL MEDICATIONS GIVEN AND ALL 
NEEDS MET. WILL ENDORSE TO  NIGHT SHIFT NURSE FOR SOPHIA.
RN CLOSING NOTE 



RESTING IN BED. A/OX4. O2 15L NRB. NO RESP DISTRESS. NO PAIN, NO DISTRESS. IV MAINTAINED. 
TELE READS SINUS RHYTHM,. BED IS LOW AND LOCKED, HOB ELEVATED IN SEMI FOWLERS, SIDE RIAL SUP 
X2, CALL LIGHT WITHIN REACH. WILL ENDORSE TO ONCOMING SHIFT.
RN CLOSING NOTE  



PATIENTS REMAINS IN BED RESTING WITH NO DISTRESS OR DISCOMFORT. A/O X 4 AND COOPERATIVE. IS 
ON 4L O2 VIA NASAL CANULA , STATING AT 93% WITH HUMIDIFIER. PATIENT DOES NOT HAVE LABORED 
BREATHING OR GRUNTING. PATIENT IS ON COVID 19 ISOLATION, ALL ISOLATION PRECAUTIONS TAKEN. IV 
ON R AC PATENT AND FLUSHING WELL. PATIENTS BED IN LOW POSITION, LOCKED, AND CALL LIGHT 
WITHIN REACH. ALL PATIENT NEEDS MET. WILL ENDORSE PATIENT CARE TO ONCOMING AM SHIFT.
RN CLOSING NOTES

WILL ENDORSE PATIENT TO DAY SHIFT NURSE WHILE PATIENT IN BED, A/O X4, ABLE TO VERBALIZE 
NEEDS. PATIENT ON TELE MONITOR, NSR AT THIS TIME, HR 73. PATIENT ON O2 THERAPY AT 15L VIA 
NONREBREATHER MASK, O2 %, MINOR COUGH NOTED. RIGHT AC 20G IV ACCESS IN PLACE, PATENT 
WITH NO SIGNS OF INFILTRATION. ALL SAFETY MEASURES IMPLEMENTED. BED LOCKED AND IN LOWEST 
POSITION, CALL LIGHT WITHIN REACH, 3 SIDE RAILS UP. WILL ENDORSE TO DAY SHIFT NURSE FOR SOPHIA.
RN CLOSING NOTES

WILL ENDORSE PATIENT TO DAY SHIFT NURSE WHILE PATIENT IN BED, SLEEPING BUT WAKES UP TO NAME, 
A/O X4, ABLE TO VERBALIZE NEEDS. PATIENT ON TELE MONITOR, NSR AT THIS TIME, HR 64. PATIENT 
ON O2 THERAPY VIA NRB 15L/MIN, O2 SAT 91%, MINOR COUGH NOTED. RIGHT AC 20G IV ACCESS IN 
PLACE, PATENT WITH NO SIGNS OF INFILTRATION. ALL SAFETY MEASURES IMPLEMENTED. BED LOCKED AND 
IN LOWEST POSITION, CALL LIGHT WITHIN REACH, 3 SIDE RAILS UP. WILL ENDORSE TO DAY SHIFT 
NURSE FOR SOPHIA.
RN CLOSING NOTES;



PT IN BED IN PRONE POSITION. A/OX4, NO SOB OR DISTRESS NOTED. PT IS NOW ON NC AT 4LPM. 
SATING BETWEEN 92%-95%. NO C/O PAIN AT THIS TIME. ALL MEDICATIONS GIVEN AND TOLERATED WELL. 
PT KEPT CLEAN, DRY AND COMFORTABLE. RC CALLED AND WAS GIVEN AN UPDATE ON PT CURRENT 
HEALTH STATUS. ALL SAFETY MEASURES RENDERED, BED IN LOWEST POS. LOCKED, SIDE RAILS X3 WITH 
CALL LIGHT WITHIN REACH. WILL ENDORSE TO NIGHT SHIFT RN. NO SIGNIFICANT CHANGES IN PT HEALTH 
STATUS DURING SHIFT. PT IN STABLE CONDITION.
RN CLOSING NOTES;



PT IN BED IN PRONE POSITION. PT A/OX4, NO C/O PAIN AT THIS TIME. NO SOB OR DISTRESS NOTED. 
PT ON RNM AT 10LPM SATING 94-98. PT DOWNGRADED TO TELEMETRY. ATTEMPTED TO PUT PT ON NC AT 
6LPM. PT COULD NOT TOLERATE, SO PUT PT BACK ON NRM. YARITZA ML NOTED, FLUSHED PATENT WITH ON 
SIGNS OF INFECTION. ALL MEDICATIONS GIVEN AND TOLERATED WELL. PT HAD L BM, PT WAS ABLE TO 
ASSIST IN CLEAN UP. PT KEPT CLEAN, DRY AND COMFORTABLE. ALL SAFETY MEASURES RENDERED, BED 
LOCKED IN LOWEST POS. SIDE RAILS UP X3, WITH CALL LIGHT WITHIN REACH. NO SIGNIFICANT CHANGES 
IN PT HEALTH STATUS. WILL ENDORSE TO NIGHT SHIFT RN. PT IN STABLE CONDITION.
RN MORNING NOTE



PT RECEIVED IN BED SLEEPING. PT IS ON 12 L NON REBREATHER MASK SAT 95% TOLERATING WELL WITH 
NO SIGNS OF LABORED BREATHING OR DISTRESS. PT IS AOX4 ENGLISH SPEAKING. ON TELE MONITOR SR. 
PT IS BR, REGULAR DIET, IV ACCESS YARITZA MIDLINE. BED IS LOCKED IN LOWEST POSITION X2 GUARD 
RAILS, ALL HOSPITAL PROTOCOLS ARE IN PLACE. WILL CONTINUE TO MONITOR THIS SHIFT.
RN NOTE



NO CHANGES IN PT CONDITION DURING SHIFT. PT IS ON 10L OF O2 VIA SIMPLE FACE MASK SHOWING NO 
S/S OF RESP DISTRESS. BREATHING EVEN AND UNLABORED. PT DENYING ANY SOB WITH CURRENT FACE 
MASK. IV ACCESS NOTED ON RIGHT UPPER ARM MIDLINE. LINE FLUSHED, PATENT, AND INTACT WITH NO 
SIGNS OF INFILTRATION. ALL DUE MEDS GIVEN AS ORDERED. PT KEPT CLEAN AND COMFORTABLE. ALL 
SAFETY MEASURES IMPLEMENTED. CALL LIGHT WITHIN REACH. BED ALARM ON. BED LOCKED AND IN LOWEST 
POSITION. WILL ENDORSE TO MORNING SHIFT RN FOR SOPHIA.
RN NOTE



PATIENT ON HIGH FLOW NC PER MD ORDER SATURATING OF 98%. WILL CONTINUE TO MONITOR.
RN NOTE



PATIENT PLACED ON NONREBREATHER AT 15L. O2 SATURATION STABLE BETWEEN 94-98%. PATIENT DOES 
NOT REPORT ANY SHORTNESS OF BREATH. WILL CONTINUE TO MONITOR.
RN NOTE



PATIENT REQUESTING FOR COUGH MEDICINE. PATIENT NOTED WITH MILD NON PRODUCTIVE COUGH. WHEN 
COUGHING, OXYGEN DROPS TO 85-88 PERCENT. PATIENT ON NON-REBREATHER MASK AT 15L/MIN. 
ADMINISTERED ROBITUSSIN/CODEINE 10 ML PER MD ORDER. WILL CONTINUE TO MONITOR. CALL LIGHT 
WITHIN REACH.
RN NOTE



PATIENT SLEEPING AT THIS TIME. STILL ON NON-REBREATHER MASK, TITRATED OXYGEN TO 12L/MIN. 
TOLERATING WELL AT 96% OXYGEN. OXYGEN SATURATION TAKEN DURING RESTING PERIOD. HOB ELEVATED 
30 DEGREES. NO COUGH AT THIS TIME. WILL CONTINUE TO MONITOR, CALL LIGHT WITHIN REACH.
RN NOTE



PATIENT STARTED ON ACTEMRA. PATIENT STILL ON HIGH FLOW NC SATURATING %. PATIENT 
TOLERATING THE MEDICATION WELL. WILL CONTINUE TO MONITOR.
RN NOTE



PATIENT TOLERATING 13L/MIN VIA NON-REBREATHER. OXYGEN SATURATION OF 93 PERCENT. HOWEVER, 
SATURATION DROPS TO 88-89% WHEN REPOSITIONING AND TALKING. PATIENT PREFERS TO SLEEP ON RIGHT 
SIDE AT THIS TIME. WILL CONTINUE TO MONITOR. CALL LIGHT WITHIN REACH.
RN NOTE



PT ON BED. WATCHING TV. NO SOB. ON NRB MASK 12LPM SATURATING 90-94. NO C/O PAIN. NEEDS 
ATTENDED. SL TO YARITZA MIDLINE INTACT AND PATENT. COVID 19 ISOLATION PRECAUTION AND  SAFETY 
MEASURES OBSERVED. WILL ENDORSE CARE TO NOC RN.
RN NOTE



PT RECEIVED IN BED RESTING. PT IS ON 10L OF O2 VIA SIMPLE FACE MASK SHOWING NO S/S OF RESP 
DISTRESS. BREATHING EVEN AND UNLABORED. PT IS ALERT AND ORIENTED X4. ON CARDIAC MONITOR 
SHOWING NSR. ABLE TO AMBULATE WITH ASSISTANCE. SKIN INTACT. ON REGULAR DIET. IV ACCESS NOTED 
ON RIGHT UPPER ARM MIDLINE. LINE FLUSHED, PATENT, AND INTACT WITH NO SIGNS OF INFILTRATION. 
ALL SAFETY MEASURES IMPLEMENTED. CALL LIGHT WITHIN REACH. BED ALARM ON. BED LOCKED AND IN 
LOWEST POSITION. WILL CONTINUE TO MONITOR AND ASSESS FOR ANY CHANGES DURING SHIFT.
RN NOTE



PT RESTING IN BED, WITH O2 NON REBREATHER MASK ON 15L, WITH O2 SAT OF 93-98%. NOT IN 
RESPIRATORY DISTRESS. ALERT AND VERBALLY RESPONSIVE. YARITZA MIDLINE IN PLACE AND PATENT. ALL 
SAFETY MEASURES FOLLOWED. WILL CONTINUE TO MONITOR. NO SS OF HYPO/HYPERGLYCEMIA NOTED THIS 
SHIFT. ALL DUE MEDICATIONS TAKEN. ENDORSED TO NEXT SHIFT RN.
RN NOTE



PT RESTING IN BED, WITH O2 NON REBREATHER MASK ON 15L, WITH O2 SAT OF 96%. NOT IN 
RESPIRATORY DISTRESS. ALERT AND VERBALLY RESPONSIVE. YARITZA MIDLINE IN PLACE AND PATENT. ALL 
SAFETY MEASURES FOLLOWED. WILL CONTINUE TO MONITOR.
RN NOTE



RECEIVED PATIENT IN BED, AWAKE, ALERT, AND VERBALLY RESPONSIVE. AOX4. ABLE TO MAKE NEEDS 
KNOWN. BREATHING EVEN AT THIS TIME, MILD SHORTNESS OF BREATH NOTED. TOLERATING OXYGEN AT 
15L/MIN VIA NON-REBREATHER MASK. RIGHT NOW PATIENT WIT SATURATION OF 90% TO 93% WHILE 
TALKING. DRY COUGH NOTED. DENIES CHEST PAIN. PATIENT ON TELE MONITORING, SR, AT 63 BPM. SKIN 
WARM AND DRY, DENIES BODY PAIN AT THIS TIME. NOTED WITH YARITZA MIDLINE, PATENT. NO INFILTRATION 
NOTED. NO BLEEDING NOTED AT THIS TIME. BED LOW, IN LOCKED POSITION, CALL LIGHT WITHIN REACH.
RN NOTE



REPORT REC'D AT BEDSIDE. PATIENT AWAKE, A/OX4. IN NO ACUTE DISTRESS. ON SIMPLE FACE MASK 
SATURATING 90%. NO SOB. NO COMPLAINTS MADE. ON ISOLATION PRECAUTION FOR COVID. IV TO YARITZA 
MIDLINE PATENT AND INTACT. NO S/SX OF INFILTRATION NOTED. SR ON TELE MONITOR. SAFETY 
MEASURES OBSERVED. WILL CONTINUE TO MONITOR.
RN NOTE



SPOKE WITH RELATIVE, VASHTI, GAVE AN UPDATE ON PATIENTS CONDITION.
RN NOTE



SPUTUM COLLECTED, PLACED IN STERILE CUP/BIOHAZARD BAG, PLACED IN FRIDGE. LAB NOTIFIED.
RN NOTE

BLOOD GLUCOSE AT 7:30 WAS 73, PROVIDED ORANGE JUICE AND FOOD, REASSESS AFTER ONE HOUR, IT 
.
RN NOTE

EXPLAINED TO PT AND FAMILY MEMBER DAUGHTER, THAT ACCORDING TO DR CROSS, LIPITOR AND BP MED IS 
ON HOLD AND NEED TO FOLLOW UP WITH PCP.
RN NOTE

PATIENT OBSERVED IN BED, AWAKE ALERT AND ORIENTED X4, ABLE TO VERBALIZE NEED. ON NRB MASK 
12LPM SATURATING 90%-94%, NO C/O PAIN. ON TELE MONITOR SR AT THIS TIME.WITH YARITZA MIDLINE 
INTACT AND PATENT FLUSHING WELL. COVID 19 ISOLATION PRECAUTION AND  SAFETY MEASURES 
OBSERVED. BED WHEELS LOCK, CALL LIGHT WITHIN REACH, WILL CONTINUE TO MONITOR.
RN NOTE

PATIENT OBSERVED IN BED, AWAKE ALERT AND ORIENTED X4, ABLE TO VERBALIZE NEED. ON NRB MASK 
12LPM SATURATING 90%-94%, NO C/O PAIN. ON TELE MONITOR SR AT THIS TIME.WITH YARITZA MIDLINE 
INTACT AND PATENT FLUSHING WELL. COVID 19 ISOLATION PRECAUTION AND  SAFETY MEASURES 
OBSERVED. BED WHEELS LOCK, CALL LIGHT WITHIN REACH, WILL ENDORSE TO NOC SHIFT.
RN NOTE

PATIENT ON SIMPLE MASK 10LPM O2 SAT OF 82% INCREASE TO 15LPM O2 SAT OF 86%,

PATIENT STARTED WITH NRB @ 12LPM PATIENT O2 SAT OF 95%, DR. LOGAN MADE AWARE.
RN NOTE

PATIENT SEEN BY DR. CROSS, WILL CONTINUE TO MONITO PATIENT ON O2 @ 12LPM VIA NRB.
RN NOTE

PULMONOLOGY AT BED SIDE, CONTINUE WITH SIMPLE MASK 10 L AND NEW ORDER FOR METFORMIN 02 SAT 
90% WILL CONTINUE TO MONITOR
RN NOTE

pt dc at 11:45, with portable o2, at 3 l/m, the wife pick the pt up.
RN NOTE 



ACCIDENTALLY CHARTED UNDER WRONG USER ACCOUNT.
RN NOTE ACCUCHECK



PT BS 94. PER SLIDING SCALE, NO COVERAGE NEEDED AT THIS TIME.
RN NOTE ACCUCHECK



PT BS 95. PER SLIDING SCALE, NO COVERAGE NEEDED AT THIS TIME.
RN NOTE MEDICATION



DECADRON APPEARS ON EMAR, BUT NOT ON SCHEDULED MEDS ON OMNICELL. SPOKE WITH PHARMACY, 
DENIS ZAPATA TO PULL FUTURE DOSE IN ORDER TO GIVE CURRENT SCHEDULED DOSE.
RN NOTE O2 TITRATION



PT CONTINUES TO BE ON 11L VIA NON REBREATHER MAST IN LATERAL LEFT PRONE POSITION. PT SAT 98% 
TOLERATING WELL WITH NO SIGNS OF LABORED BREATHING OR DISTRESS. WILL CONTINUE TO MONITOR.
RN NOTE O2 TITRATION



PT IS ON 11L VIA NON REBREATHER SAT 98% WHILE IN PRONE LEFT LATERAL POSITION. WILL TITRATE 
TO 10L AND CONTINUE TO MONITOR O2 SAT AND TOLERATION.
RN NOTE O2 TITRATION



PT IS ON 12L VIA NON REBREATHER SAT 94%. WILL TITRATE TO 11L AND CONTINUE TO MONITOR O2 SAT 
AND TOLERATION.
RN NOTES





BS 121MG/DL NO COVERAGE. PATIENT COMFORTABLE IN BED. WILL CONTINUE TO MONITOR.
RN NOTES





BS 121MG/DL NO COVERAGE. PATIENT IS COMFORTABLE IN BED NO DISTRESS. WILL CONTINUE TO MONITOR
RN NOTES





PATIENT IN BED. NO DISTRESS O2 SAT 97%. OXYGEN INHALATION VIA NC DECREASED TO 3LPM 
TOLERATING WELL BY THE PATIENT SATING 96%. WILL CLOSELY MONITOR THE PATIENT.
RN NOTES





PATIENT REMAINS STABLE NO SIGNIFICANT CHANGES IN HEALTH CONDITION THIS SHIFT. NO SOB NO 
DISTRESS PATIENT ON 3L PM VIA NC TOLERATING WELL SATING 96%.ALL DUE MEDS GIVEN AS ORDERED. 
ALL SAFETY MEASURES IN PLACE AT ALL TIMES, BED ON LOWEST POSITION AND LOCKED. CALL LIGHT 
WITHIN REACH. ALL NEEDS ATTENDED PROMPTLY. ENDORSED.
RN NOTES





PATIENT REMAINS STABLE THE WHOLE SHIFT NO SOB NO DISTRESS NOTED. PATIENT STILL ON NON 
REBREATHER MASK AT 12L TOLERATING WELL SATING 92-97%. ALL DUE MEDS GIVEN AS ORDERED.PATIENT 
ENCOURAGE TO DO DEEP BREATHING AND TURN FROM LEFT TO RIGHT SIDE AND PRONE POSITION 
TOLERATING WELL. ALL SAFETY MEASURES IN PLACE AT ALL TIMES. HOB ELEVATED. CALL LIGHT WITHIN 
REACH. BED ON LOWEST POSITION AND LOCKED. ALL NEEDS ATTENDED PROMPTLY. KEPT CLEAN AND DRY AT 
ALL TIMES. FREQUENT VISUAL MONITORING RENDERED.ENDORSED.
RN NOTES





RECEIVED REPORT FROM MORNING RN. PATIENT A/O X 4 ABLE TO MAKE NEEDS KNOWN. WITH OXYGEN 
INHALATION VIA NASAL CANULA AT 4LPM TOLERATING WELL SATING 97%. WITH YARITZA MIDLINE PATENT 
FLUSHES WELL. VITAL SIGNS TAKEN AND RECORDED. ALL SAFETY MEASURES IN PLACE AT ALL TIMES. 
CALL LIGHT WITHIN REACH. HOB ELEVATED. BED ON LOWEST POSITION AND LOCKED. CALL LIGHT WITHIN 
REACH. WILL CONTINUE TO MONITOR THE PATIENT.
RN NOTES





RECEIVED REPORT FROM MORNING RN. PATIENT A/O X4 ABLE TO MAKE NEEDS KNOWN. PATIENT IN BED 
WATCHING TV NO SOB NOT IN DISTRESS. VITAL SIGNS TAKEN AND RECORDED. PATIENT WITH R UA ML 
PATENT FLUSHES WELL NO INFILTRATION NOTED. ON NON REBREATHER MASK AT 12L TOLERATING WELL 
SATING AT 92%. ALL SAFETY MEASURES IN PLACE AT ALL TIMES. BOTH SIDE RAILS UP FOR SAFETY. HOB 
ELEVATED. CALL LIGHT WITHIN REACH. WILL CLOSELY MONITOR THE PATIENT.
RN NOTES



ACCUCHECK DONE.  MG/DL. PATIENT JUST ATE. REFUSE INSULIN COVERAGE FOR NOW. DR. CROSS 
AWARE.
RN NOTES



ALL NEEDS MET AT THIS TIME. NO SIGNIFICANT CHANGE IN CONDITION. STABLE.

ENDORSED TO NEXT SHIFT FOR SOPHIA.
RN NOTES



DUE MEDS  GIVEN
RN NOTES

PATIENT HAS BEEN ON 15L NRB, PATIENT NOW AT 99% O2 SAT, NO SOB NOTED, NO S/S OF ACUTE 
RESPIRATORY DISTRESS NOTED. WILL CONTINUE TO MONITOR.
RN NOTES - 



RECEIVED  PATIENT IN BED SLEEPING EASY TO AROUSE, A/O X4, ABLE TO VERBALIZE NEEDS. HELPED PT 
TO TOILET, BM SOFT BROWN FORMED NORMAL ODOR NOTED, PATIENT ON TELE MONITOR, NSR , PATIENT ON 
O2 THERAPY VIA NRB AT 15L, O2 SAT 92-96%, COUGH NOTED. RIGHT AC 20G IV ACCESS IN PLACE, 
PATENT, INTACT, NO SIGNS OF INFILTRATION,  ALL SAFETY MEASURES IMPLEMENTED, BED LOCKED, IN 
LOWEST POSITION, CALL LIGHT WITHIN REACH, 3 SIDE RAILS UP. COMPLIANT WITH CARE, NO SOB NOTED 
NOTED NO C/O PAIN, SAFETY MEASURES IN PLACE, BED WHEELS LOCKED, AND BED LOW TO FLOOR.
RN NOTES,



PATIENT ASLEEP AROUSES TO VERBAL STIMULI, ON  9 L NON REBREATHER MASK SAT > 94% TOLERATING 
WELL, WITH EPISODES OF DESATURATION ONLY WHEN HE MOVES, BUT QUICKLY IMPROVES TO 90S, 92-96%, 
NSR/SINUS TASHA WITH HR MOSTLY 50-60S,  YARITZA MIDLINE S/L, ENCOURAGED PRONE POSITION, BUT 
PATIENT PREFER LEFT SIDED,  BED  LOCKED IN LOWEST POSITION X2 GUARD RAILS, CALL LIGHT W/I 
REACH, REPORT GIVEN TO DAUGHTER X2 DURING SHIFT,   WILL ENDORSE CONTINUITY OF CARE TO 
ONCOMING  NURSE.
RN NOTES:



BLOOD SUGAR 126. NO COVERAGE GIVEN. NO S/S OF HYPER/HYPOGLYCEMIA
RN NOTES;



B/S TAKEN AT 77. NO INSULIN NEEDED PER SLIDING SCALE. WILL CONTINUE TO MONITOR. PT ENCOURAGE 
TO EAT HIS BREAKFAST.
RN NOTES;



B/S TAKEN WITH RESULT 102. NO INSULIN GIVEN PER SLIDING SCALE. WILL CONTINUE TO MONITOR.
RN NOTES;



B/S TAKEN WITH RESULT OF 96. NO INSULIN NEEDED PER SLIDING SCALE.
RN NOTES;



BLOOD SUGAR TAKEN WITH RESULTS OF 94. NO INSULIN NEEDED PER SLIDING SCALE. WILL CONTINUE TO 
MONITOR.
RN NOTES;



BS TAKEN WITH RESULT . NO INSULIN NEEDED PER SLIDING SCALE. WILL CONTINUE TO MONITOR.
RN OPEN NOTE



RECEIVED PT RESTING IN BED ALERT AND ORIENTED X4. PT IS ON 10L VIA SIMPLE FACE MASK O2 SAT 
90% SHOWING NO S/S OF RESP DISTRESS OR SOB. BREATHING EVEN AND UNLABORED. IV ACCESS NOTED ON 
RIGHT UPPER ARM MIDLINE. FLUSHED, PATENT, AND INTACT WITH NO SIGNS OF INFILTRATION. ALL 
SAFETY MEASURES IMPLEMENTED. CALL LIGHT WITHIN REACH. BED ALARM ON. BED LOCKED AND IN LOWEST 
POSITION. WILL CONTINUE TO MONITOR
RN OPENING NITE



PATIENT RECEIVED IN BED, RESTING. PATIENT ON 15L NRB SATURATING AT 96% WITH NO SIGNS OF 
LABORED BREATHING AT THIS TIME. RIGHT AC 20G PIV IN PLACE, PATENT WITH NO SIGNS OF 
INFILTRATION. BED LOCKED AND IN LOWEST POSITION, CALL LIGHT WITHIN REACH, 2 SIDE RAILS UP. 
NO SIGNS OF DISTRESS NOTED AT THIS TIME. WILL CONTINUE TO MONITOR.
RN OPENING NOTE



PATIENT RECEIVED IN BED, RESTING. PATIENT ON 5L O2 NC WITH NO SIGNS OF LABORED BREATHING AT 
THIS TIME. RIGHT AC 20G IN PLACE, PATENT WITH NO SIGNS OF INFILTRATION. NO SIGNS OF DISTRESS 
NOTED AT THIS TIME. BED LOCKED AND IN LOWEST POSITION, CALL LIGHT WITHIN REACH, 3 SIDE RAILS 
UP. WILL CONTINUE TO MONITOR.
RN OPENING NOTE



RECEIVED PATIENT RESTING IN BED ALERT AND ORIENTED X4. PT IS ON 10L VIA SIMPLE FACE MASK O2 
SAT 90% SHOWING NO S/S OF RESP DISTRESS OR SOB. PATIENT IS SHOWING NO UNLABORED BREATHING. 
IV ACCESS NOTED ON RIGHT UPPER ARM MIDLINE. FLUSHED, PATENT, AND INTACT WITH NO SIGNS OF 
INFILTRATION.PATIENT ON TELE MONITOR WITH SR AND HR OF 80. ALL SAFETY MEASURES CHECKED, CALL 
LIGHT WITHIN REACH. BED ALARM ON. BED LOCKED AND IN LOWEST POSITION.
RN OPENING NOTE 



RECEIVED CARE OF PATIENT WHILE PATIENT IN BED, A/O X4, ABLE TO VERBALIZE NEEDS. PATIENT ON 
TELE MONITOR, NSR AT THIS TIME. PATIENT ON O2 THERAPY VIA NRB AT 15L, O2 SAT 96%, MINOR 
COUGH NOTED. RIGHT AC 20G IV ACCESS IN PLACE, PATENT WITH NO SIGNS OF INFILTRATION. ALL 
SAFETY MEASURES IMPLEMENTED. BED LOCKED AND IN LOWEST POSITION, CALL LIGHT WITHIN REACH, 3 
SIDE RAILS UP.
RN OPENING NOTE 



RECEIVED PATIENT IN BED. A/OX4. ON OXYGEN 15L VIA NONREBREATHER. RESPIRATIONS ARE EVEN AND 
UNLABORED. NO S/S SOB NOTED. PATIENT HAS A DRY COUGH. NO C/O PAIN AT THIS TIME. IN NO 
APPARENT DISTRESS. IV ACCESS IN RAC#20 PATENT AND SLAINE LOCKED. TELE MONITOR READS SINUS 
RHYTHM. BED IS LOW AND LOCKED, HOB ELEVATED IN SEMI FOWLERS, SIDE RAILS UPX2, DONYA LIGHT 
WITHIN REACH.
RN OPENING NOTE  





PATIENT IS A/O X4. RECEIVED PATIENT IN BED RESTING  WITH NO DISTRESS OR DISCOMFORT. ON TELE  
MONITOR WITH SINUS RYTHYM, AND HR AT 72. PATIENT IN NO RESPIRATORY DISTRESS OF LABORED 
BREATHING, ON 3L OF NASAL CANULA SATURATION OF 94%. IV NOTED ON RAC #20 G, FLUSHED AND 
PATENT. BED IN LOW POSITION, LOCKED, AND CALL LIGHT WITHIN REACH.
RN OPENING NOTES

RECEIVED CARE OF PATIENT WHILE PATIENT IN BED, A/O X4, ABLE TO VERBALIZE NEEDS. PATIENT ON 
TELE MONITOR, NSR AT THIS TIME, HR 64. PATIENT ON O2 THERAPY VIA HIGH FLOW NC AT 40L, O2 SAT 
96%, MINOR COUGH NOTED. RIGHT AC 20G IV ACCESS IN PLACE, PATENT WITH NO SIGNS OF 
INFILTRATION. ALL SAFETY MEASURES IMPLEMENTED. BED LOCKED AND IN LOWEST POSITION, CALL LIGHT 
WITHIN REACH, 3 SIDE RAILS UP. WILL CONTINUE TO MONITOR FOR ANY CHANGES.
RN OPENING NOTES

RECEIVED CARE OF PATIENT WHILE PATIENT IN BED, A/O X4, ABLE TO VERBALIZE NEEDS. PATIENT ON 
TELE MONITOR, NSR AT THIS TIME, HR 72. PATIENT ON O2 THERAPY AT 5L VIA NC, O2 SAT 93%, MINOR 
COUGH NOTED, ALLEVIATED WITH REPOSITIONING AND COUGHING EXERCISES. RIGHT AC 20G IV ACCESS IN 
PLACE, PATENT WITH NO SIGNS OF INFILTRATION. ALL SAFETY MEASURES IMPLEMENTED. BED LOCKED AND 
IN LOWEST POSITION, CALL LIGHT WITHIN REACH, 3 SIDE RAILS UP. WILL CONTINUE TO MONITOR FOR 
ANY CHANGES.
RN OPENING NOTES;



RECEIVED PT IN BED IN SEMI FOWLERS POS. PT A/OX4, NO SOB OR DISTRESS NOTED. PT HAS NO C/O 
PAIN AT THIS TIME. PT ON NRM AT 10L TOLERATING WELL SATING AT 97-98%. YARITZA ML NOTED, FLUSHED 
WITH NO SIGNS OF INFILTRATION. ENCOURAGE PT TO BE IN PRONE POS. PT STATES HE DOES NOT LIKE 
BEING IN PRONE. EDUCATION DONE, PT STATES UNDERSTANDING. ALL SAFETY MEASURES RENDERED, BED 
IN LOWEST POS. LOCKED, RAILS X3 WITH CALL LIGHT WITHIN REACH. WILL CONTINUE TO MONITOR.
RN OPENING NOTES;



RECEIVED PT IN BED IN SEMI- SCHAFFER POSITION. ALERT, ORIENTED X4, VERBALLY RESPONSIVE. ON 
3L/MIN VIA N/C, O2 SAT 94%. NO SOB OR DISTRESS NOTED. BREATHING EVEN AND UNLABORED.  NO C/O 
PAIN AT THIS MOMENT. NO ACUTE DISTRESS.  YARITZA MIDLINE INTACT AND PATENT.  NO S/S OF 
INFILTRATION. ALL SAFETY MEASURES RENDERED, BED IN LOWEST POSITION AND LOCKED, SIDE RAILS X3 
UP. PLACE CALL LIGHT WITHIN REACH. WILL CONTINUE TO MONITOR.
RN OPENING NOTES;



RECEIVED PT IN BED IN SEMI- SCHAFFER POSITION. ALERT, ORIENTED X4, VERBALLY RESPONSIVE. ON 
4L/MIN VIA N/C, O2 SAT 94%. NO SOB OR DISTRESS NOTED. BREATHING EVEN AND UNLABORED.  NO C/O 
PAIN AT THIS MOMENT. NO ACUTE DISTRESS.  YARITZA MIDLINE INTACT AND PATENT.  NO S/S OF 
INFILTRATION. ALL SAFETY MEASURES RENDERED, BED IN LOWEST POSITION AND LOCKED, SIDE RAILS X3 
UP. PLACE CALL LIGHT WITHIN REACH. WILL CONTINUE TO MONITOR.
RN OPENING NOTES;



RECEIVED PT IN BED IN SEMI- SCHAFFER POSITION. ALERT, ORIENTED X4, VERBALLY RESPONSIVE. ON 
4L/MIN VIA N/C, O2 SAT 94%. NO SOB OR DISTRESS NOTED. BREATHING EVEN AND UNLABORED.  NO C/O 
PAIN AT THIS MOMENT. NO ACUTE DISTRESS.  YARITZA MIDLINE INTACT AND PATENT.  NO S/S OF 
INFILTRATION. ALL SAFETY MEASURES RENDERED, BED IN LOWEST POSITION AND LOCKED, SIDE RAILS X3 
UP. PLACE CALL LIGHT WITHIN REACH. WILL CONTINUE TO MONITOR.
RN OPENING NOTES;



RECEIVED PT IN BED IN SUPINE POS. PT A/OX4, NO SOB OR DISTRESS NOTED. PT HAS NO C/O PAIN AT 
THIS TIME. PT ON NRM AT 10L TOLERATING WELL SATING AT 96%. YARITZA ML NOTED, NO SIGNS OF 
INFILTRATION. PT STATED HE HAS TRIED BEING IN PRONE POS, BUT HE DOES HAS TO GET USE TO IT. 
WILL TRY TO DECREASE O2 TODAY TO SEE IF PT CAN TOLERATE. ALL SAFETY MEASURES RENDERED, BED 
IN LOWEST POS. LOCKED, RAILS X3 WITH CALL LIGHT WITHIN REACH. WILL CONTINUE TO MONITOR.
RN notes



Alert and oriented, able to verbalize needs. In bed resting comfortably with no distress 
noted. On 12lpm via non rebreather mask, tolerating well. Daughter called and spoke 3x 
wanting to reposition the patient to prone position. Encourage patient but patient cannot 
tolerate prone position. Instead patient wanted 2 pillows instead. Patient is able to turn 
side to side and able to relax for several 2 hours. No significant change of condition. Kept 
clean and dry. Will endorse to next shift for continuity of care.
RN radha



Alert and oriented, in bed resting comfortably. Complaint of severe headache 8/10. Tylenol 
given with relief. Patient has episode of pullng out IV line and and throwing the iv poll. 
No injury noted. Was pacified and comforted. Convinced to take his meds and insert another 
line. Went to sleep calmly. NO significant change of condition. Will endorse to next shift 
for continuity of care.
ROOM 104
RT NOTE



FOUND PT ON 5LPM NC, SPO2 AT 89-93%. PLACED ON NRB AT 15L TO IMPROVE O2 WHILE HES ASLEEP. 
PTS DESATURATES TO BELOW 88 WHILE ASLEEP OR MOVES AROUND A LOT. CHANGED PROBE, PT CURRENTLY 
SATURATING AT 95-98%. RN AWARE. CHARGE NURSE AWARE. WILL CONTINUE TO MONITOR T/O SHIFT. NO 
SOB OR S/S OF ACUTE RESPIRATORY DISTRESS NOTED.
RT NOTES

PATIENT'S O2 SAT DECREASED TO 79% WHILE SLEEPING, PATIENT IS ON 5L 02 THERAPY VIA NC. 
NOTIFIED RT. WILL ASSIST RT WITH INTERVENTIONS. WILL CONTINUE TO MONITOR.
SPOKE WITH [facility]  KHLOE AND INFORMED HER THAT THE PATIENT NEEDS 
OXYGEN DELIVERY AND MONOCLONIAN ANTIBODIES PER DR FITZGERALD TO BE SET UP AT HOME.

[facility]  MADE AWARE TO FOLLOW UP WITH KHLOE.



KHLOE  [phone]

KHLOE [phone]
TELE RN NOTE 

COUGH MEDICATION ROBITUSSIN GIVEN FOR COUGH  ,CALLED TO  PHARMACY ABOUT REMDESEVIP STATED 
THAT  STILL AWAITING FOR APPROVED
TELE/RN CLOSING NOTE



PATIENT CURRENTLY SLEEPING IN BED. ALERT AND ORIENTED X 4. ABLE TO MAKE NEEDS KNOWN. DENIES 
PAIN AT THIS TIME. CONTINUES ON O2 4L VIA NC WITH NO S/SX OF RESPIRATORY DISTRESS NOTED. IV 
ACCESS TO RIGHT AC #20G INTACT, PATENT AND SALINE LOCKED. CONTINUES ON IV ABX. CONTINUES ON 
DECADRON AND REMDESIVIR. PATIENT IS AMBULATORY WITH STEADY GAIT. TELE MONITOR CURRENTLY 
READING SR HR 75. CALL LIGHT WITHIN REACH. ASPIRATION, FALL AND SAFETY PRECAUTIONS 
MAINTAINED. WILL ENDORSE PLAN OF CARE TO ONCOMING SHIFT.
TELE/RN OPENING NOTE



RECEIVED PATIENT SITTING IN CHAIR. ALERT AND ORIENTED X 4. ABLE TO MAKE NEEDS KNOWN. DENIES 
PAIN AT THIS TIME. CONTINUES ON O2 3L VIA NC WITH NO S/SX OF RESPIRATORY DISTRESS NOTED. IV 
ACCESS TO RIGHT AC #20G INTACT, PATENT AND SALINE LOCKED. CONTINUES ON IV ABX. CONTINUES ON 
DECADRON AND REMDESIVIR. C/O COUGH - WILL ADMINISTER PRN COUGH  MEDICATION. PATIENT IS 
AMBULATORY WITH STEADY GAIT. TELE MONITOR CURRENTLY READING SR. CALL LIGHT WITHIN REACH. 
ASPIRATION, FALL AND SAFETY PRECAUTIONS MAINTAINED. WILL CONTINUE TO MONITOR.
THE PATIENT IS RECEIVED IN ER BED#5. ALERT AND ORIENTED X4. DENIES PAIN. 
RECEIVING OXYGEN AT 3L/MIN VIA NASAL CANNULA AND DENIES SOB. RESPIRATION 
REGULAR AND UNLABORED. ATTACHED TO THE MONITOR. WARM BLANKET PROVIDED FOR 
COMFORT. WILL CONTINUE TO MONITOR THE PATIENT.
THE PATIENT`S OXYGEN SATURATION IN ROOM AIR IS AT 88%. KEEPING THE PATIENT ON 
OXYGEN AT 3L/MIN VIA NASAL CANNULA AND KEEP SATURATION WNL.
arron maciel note 

on simple mask 10 l ,saturation 91% reposition on lt side and encouraged to be in side on 
prone position , will monitor

-------------------------------------------------------------------------------

Addendum: 12/14/21 at 1431 by LORIE CA RN

-------------------------------------------------------------------------------

 per dr deb busby to insert mid line , order carried out
arron rn note 

 daughter requesting to give another dose of remdezevir  called to dr boyd informed about it 
, stated no extra dose , will speak with daughter tomorrow

-------------------------------------------------------------------------------

Addendum: 12/14/21 at 1826 by LORIE CA RN

-------------------------------------------------------------------------------

 assisting to br , able to ambulate to br , all needs attended
arron rn note 

 mid line on rt upper arm inserted as ordered , all needs attended, will monitor
ms rn note 

 started remdeesevir spoke with ,family  about it
rn notes



blood sugar check with a result of 114. no insulin need per sliding scale.
tele rn note

 called to pharmacy about remdesevir stated that not ready ye,t will f\u
tele rn note 

 received patent from er with dx covid  under care dr boyd alert oriented x3 , placed on 
tele monitor sr hr 85 , on 3l nc still some sob noted saturation 98% at this time,bed in 
lowest and locked position , call light within reach , plan of care discussed with patient 
,will cont to monitor
tele rn note 

called to pharmacy about remdesevir that not ready yet ,stated that awaiting for approve 
from dr brown , jhonathanf\u  spoke with family citlali  notified that awaiting for medication
1